# Patient Record
Sex: FEMALE | Race: WHITE | NOT HISPANIC OR LATINO | Employment: FULL TIME | URBAN - METROPOLITAN AREA
[De-identification: names, ages, dates, MRNs, and addresses within clinical notes are randomized per-mention and may not be internally consistent; named-entity substitution may affect disease eponyms.]

---

## 2020-06-12 ENCOUNTER — CLINICAL SUPPORT (OUTPATIENT)
Dept: URGENT CARE | Facility: CLINIC | Age: 25
End: 2020-06-12
Payer: COMMERCIAL

## 2020-06-12 VITALS
HEART RATE: 85 BPM | RESPIRATION RATE: 16 BRPM | DIASTOLIC BLOOD PRESSURE: 95 MMHG | SYSTOLIC BLOOD PRESSURE: 141 MMHG | TEMPERATURE: 98 F | OXYGEN SATURATION: 99 % | WEIGHT: 267 LBS

## 2020-06-12 DIAGNOSIS — R11.0 NAUSEA: ICD-10-CM

## 2020-06-12 DIAGNOSIS — R10.11 RIGHT UPPER QUADRANT ABDOMINAL PAIN: Primary | ICD-10-CM

## 2020-06-12 DIAGNOSIS — K59.01 SLOW TRANSIT CONSTIPATION: ICD-10-CM

## 2020-06-12 LAB
B-HCG UR QL: NEGATIVE
BILIRUB UR QL STRIP: NEGATIVE
CTP QC/QA: YES
GLUCOSE UR QL STRIP: NEGATIVE
KETONES UR QL STRIP: NEGATIVE
LEUKOCYTE ESTERASE UR QL STRIP: NEGATIVE
PH, POC UA: 6.5
POC BLOOD, URINE: NEGATIVE
POC NITRATES, URINE: NEGATIVE
PROT UR QL STRIP: NEGATIVE
SP GR UR STRIP: 1.02 (ref 1–1.03)
UROBILINOGEN UR STRIP-ACNC: NORMAL (ref 0.1–1.1)

## 2020-06-12 PROCEDURE — 81025 URINE PREGNANCY TEST: CPT | Mod: S$GLB,,, | Performed by: NURSE PRACTITIONER

## 2020-06-12 PROCEDURE — 81003 URINALYSIS AUTO W/O SCOPE: CPT | Mod: QW,S$GLB,, | Performed by: NURSE PRACTITIONER

## 2020-06-12 PROCEDURE — 99204 PR OFFICE/OUTPT VISIT, NEW, LEVL IV, 45-59 MIN: ICD-10-PCS | Mod: 25,S$GLB,, | Performed by: NURSE PRACTITIONER

## 2020-06-12 PROCEDURE — 81003 POCT URINALYSIS, DIPSTICK, AUTOMATED, W/O SCOPE: ICD-10-PCS | Mod: QW,S$GLB,, | Performed by: NURSE PRACTITIONER

## 2020-06-12 PROCEDURE — 99204 OFFICE O/P NEW MOD 45 MIN: CPT | Mod: 25,S$GLB,, | Performed by: NURSE PRACTITIONER

## 2020-06-12 PROCEDURE — 81025 POCT URINE PREGNANCY: ICD-10-PCS | Mod: S$GLB,,, | Performed by: NURSE PRACTITIONER

## 2020-06-12 PROCEDURE — 74022 RADEX COMPL AQT ABD SERIES: CPT | Mod: S$GLB,,, | Performed by: NURSE PRACTITIONER

## 2020-06-12 PROCEDURE — 74022 PR XRAY, ABD SERIES, COMPLETE, W/ 2+ VIEWS ABD, 1 VIEW CHEST: ICD-10-PCS | Mod: S$GLB,,, | Performed by: NURSE PRACTITIONER

## 2020-06-12 NOTE — PROGRESS NOTES
"Subjective: stomach pain, upper, nausea,        Patient ID: Maxine Sanchez is a 25 y.o. female.    Vitals:  weight is 121.1 kg (267 lb). Her temperature is 98.1 °F (36.7 °C). Her blood pressure is 141/95 (abnormal) and her pulse is 85. Her respiration is 16 and oxygen saturation is 99%.     Chief Complaint: GI Problem (stomach pain, upper stomach area)    Patient presents today with complaints of upper abdominal pain that first began this morning at 3 am. She has tried TUMs and "making herself vomit" but nothing has helped. The pain is worse when sitting and when taking in a deep breath. She describes it as dull but then gets sharp with sitting and deep breaths. Denies f/c/v/d.     GI Problem   The primary symptoms include abdominal pain and nausea. Primary symptoms do not include fever, fatigue, vomiting, diarrhea or dysuria. The illness began today. The onset was sudden.   The illness does not include chills, anorexia, bloating, constipation or back pain. Associated medical issues do not include GERD, irritable bowel syndrome or diverticulitis.       Constitution: Negative for appetite change, chills, sweating, fatigue and fever.   HENT: Negative for trouble swallowing.    Cardiovascular: Negative for chest pain.   Respiratory: Negative for shortness of breath.    Gastrointestinal: Positive for abdominal pain and nausea. Negative for abdominal trauma, abdominal bloating, history of abdominal surgery, vomiting, constipation, diarrhea, dark colored stools and heartburn.   Genitourinary: Negative for dysuria, missed menses and pelvic pain.   Musculoskeletal: Negative for back pain.       Objective:      Physical Exam   Constitutional: She is oriented to person, place, and time. She appears well-developed and well-nourished.   HENT:   Head: Normocephalic and atraumatic.   Right Ear: External ear normal.   Left Ear: External ear normal.   Nose: Nose normal.   Mouth/Throat: Mucous membranes are normal.   Eyes: " Conjunctivae and lids are normal.   Neck: Trachea normal and full passive range of motion without pain. Neck supple.   Cardiovascular: Normal rate, regular rhythm and normal heart sounds.   Pulmonary/Chest: Effort normal and breath sounds normal. No respiratory distress.   Abdominal: Soft. Normal appearance and bowel sounds are normal. She exhibits no distension, no abdominal bruit, no pulsatile midline mass and no mass. There is tenderness in the right upper quadrant and epigastric area.   Musculoskeletal: Normal range of motion. She exhibits no edema.   Neurological: She is alert and oriented to person, place, and time. She has normal strength.   Skin: Skin is warm, dry, intact, not diaphoretic and not pale.   Psychiatric: She has a normal mood and affect. Her speech is normal and behavior is normal. Judgment and thought content normal. Cognition and memory are normal.   Nursing note and vitals reviewed.        Assessment:       1. Right upper quadrant abdominal pain    2. Slow transit constipation    3. Nausea        Plan:       Xray noted with moderate amount of stool to right upper abdomen. No dilated bowel or gas noted. Do not suspect an acute abdomen. Suggested pt take miralax, Increase fiber in diet, water and add probiotics.   Call or RTC if symptoms worsen or do not begin to improve    Right upper quadrant abdominal pain    Slow transit constipation    Nausea  -     POCT Urinalysis, Dipstick, Automated, W/O Scope  -     POCT urine pregnancy

## 2020-10-01 ENCOUNTER — OFFICE VISIT (OUTPATIENT)
Dept: URGENT CARE | Facility: CLINIC | Age: 25
End: 2020-10-01
Payer: COMMERCIAL

## 2020-10-01 VITALS
HEART RATE: 105 BPM | DIASTOLIC BLOOD PRESSURE: 81 MMHG | RESPIRATION RATE: 16 BRPM | SYSTOLIC BLOOD PRESSURE: 136 MMHG | TEMPERATURE: 98 F | WEIGHT: 271 LBS | OXYGEN SATURATION: 98 % | BODY MASS INDEX: 43.55 KG/M2 | HEIGHT: 66 IN

## 2020-10-01 DIAGNOSIS — M25.562 ACUTE PAIN OF LEFT KNEE: Primary | ICD-10-CM

## 2020-10-01 PROCEDURE — 73564 PR  X-RAY KNEE 4+ VIEW: ICD-10-PCS | Mod: LT,S$GLB,, | Performed by: NURSE PRACTITIONER

## 2020-10-01 PROCEDURE — 99214 OFFICE O/P EST MOD 30 MIN: CPT | Mod: 25,S$GLB,, | Performed by: NURSE PRACTITIONER

## 2020-10-01 PROCEDURE — 99214 PR OFFICE/OUTPT VISIT, EST, LEVL IV, 30-39 MIN: ICD-10-PCS | Mod: 25,S$GLB,, | Performed by: NURSE PRACTITIONER

## 2020-10-01 PROCEDURE — 73564 X-RAY EXAM KNEE 4 OR MORE: CPT | Mod: LT,S$GLB,, | Performed by: NURSE PRACTITIONER

## 2020-10-01 RX ORDER — DICLOFENAC SODIUM 50 MG/1
50 TABLET, DELAYED RELEASE ORAL 3 TIMES DAILY PRN
Qty: 30 TABLET | Refills: 0 | Status: SHIPPED | OUTPATIENT
Start: 2020-10-01 | End: 2024-02-19

## 2020-10-01 RX ORDER — LORATADINE 10 MG/1
10 TABLET ORAL DAILY
COMMUNITY

## 2020-10-01 NOTE — PROGRESS NOTES
"Subjective:       Patient ID: Maxine Sanchez is a 25 y.o. female.    Vitals:  height is 5' 5.5" (1.664 m) and weight is 122.9 kg (271 lb). Her oral temperature is 97.7 °F (36.5 °C). Her blood pressure is 136/81 and her pulse is 105. Her respiration is 16 and oxygen saturation is 98%.     Chief Complaint: Knee Pain    C/O Lt knee pain X 9/30/20, no injury  Has had the pain on and off for a while, denies trauma or injury. Reports pain with ambulation and when leg is extended, but feels ok with full to hyperextension and full flexion    Knee Pain   There was no injury mechanism. The pain is present in the left knee. The pain is mild. The pain has been fluctuating since onset. The symptoms are aggravated by movement, palpation and weight bearing. She has tried nothing for the symptoms.       Constitution: Negative for appetite change, chills and fever.   HENT: Negative for ear pain, congestion and sore throat.    Neck: Negative for painful lymph nodes.   Eyes: Negative for eye discharge and eye redness.   Respiratory: Negative for cough.    Gastrointestinal: Negative for vomiting and diarrhea.   Genitourinary: Negative for dysuria.   Musculoskeletal: Positive for pain, trauma, joint pain and abnormal ROM of joint. Negative for muscle ache.   Skin: Negative for rash.   Neurological: Negative for headaches and seizures.   Hematologic/Lymphatic: Negative for swollen lymph nodes.       Objective:      Physical Exam   Constitutional: She is oriented to person, place, and time. She appears well-developed.   HENT:   Head: Normocephalic and atraumatic.   Nose: Nose normal.   Mouth/Throat: Oropharynx is clear and moist.   Eyes: Pupils are equal, round, and reactive to light. Conjunctivae and EOM are normal.   Neck: Normal range of motion. Neck supple.   Cardiovascular: Normal rate, regular rhythm and normal heart sounds.   Pulmonary/Chest: Effort normal and breath sounds normal.   Abdominal: Soft.   Musculoskeletal:      Left " knee: She exhibits decreased range of motion and effusion. Tenderness found. Patellar tendon tenderness noted.        Legs:    Neurological: She is alert and oriented to person, place, and time.   Skin: Skin is warm and dry. Capillary refill takes less than 2 seconds. Psychiatric: Her behavior is normal.   Nursing note and vitals reviewed.        Assessment:       1. Acute pain of left knee        Plan:       Xray reviewed by me, no fracture or dislocation  Will refer to ortho, recommended RICE, NSAIDs,   Acute pain of left knee  -     XR KNEE 3 VIEW LEFT; Future; Expected date: 10/01/2020  -     Ambulatory referral/consult to Orthopedics    Other orders  -     diclofenac (VOLTAREN) 50 MG EC tablet; Take 1 tablet (50 mg total) by mouth 3 (three) times daily as needed.  Dispense: 30 tablet; Refill: 0

## 2020-10-01 NOTE — PATIENT INSTRUCTIONS
Knee Pain  Knee pain is very common. Its especially common in active people who put a lot of pressure on their knees, like runners. It affects women more often than men.  Your kneecap (patella) is a thick, round bone. It covers and protects the front portion of your knee joint. It moves along a groove in your thighbone (femur) as part of the patellofemoral joint. A layer of cartilage surrounds the underside of your kneecap. This layer protects it from grinding against your femur.  When this cartilage softens and breaks down, it can cause knee pain. This is partly because of repetitive stress. The stress irritates the lining of the joint. This causes pain in the underlying bone.  What causes knee pain?  Many things can cause knee pain. You may have more than one cause. Some of these include:  · Overuse of the knee joint  · The kneecap doesnt line up with the tissue around it  · Damage to small nerves in the area  · Damage to the ligament-like structure that holds the kneecap in place (retinaculum)  · Breakdown of the bone under the cartilage  · Swelling in the soft tissues around the kneecap  · Injury  You might be more likely to have knee pain if you:  · Exercise a lot  · Recently increased the intensity of your workouts  · Have a body mass index (BMI) greater than 25  · Have poor alignment of your kneecap  · Walk with your feet turned overly outward or inward  · Have weakness in surrounding muscle groups (inner quad or hip adductor muscles)  · Have too much tightness in surrounding muscle groups (hamstrings or iliotibial band)  · Have a recent history of injury to the area  · Are female  Symptoms of knee pain  This type of knee pain is a dull, aching pain in the front of the knee in the area under and around the kneecap. This pain may start quickly or slowly. Your pain might be worse when you squat, run, or sit for a long time. You might also sometimes feel like your knee is giving out. You may have symptoms in  one or both of your knees.  Diagnosing knee pain  Your healthcare provider will ask about your medical history and your symptoms. Be sure to describe any activities that make your knee pain worse. He or she will look at your knee. This will include tests of your range of motion, strength, and areas of pain of your knee. Your knee alignment will be checked.  Your healthcare provider will need to rule out other causes of your knee pain, such as arthritis. You may need an imaging test, such as an X-ray or MRI.  Treatment for knee pain  Treatments that can help ease your symptoms may include:  · Avoiding activities for a while that make your pain worse, returning to activity over time  · Icing the outside of your knee when it causes you pain  · Taking over-the-counter pain medicine  · Wearing a knee brace or taping your knee to support it  · Wearing special shoe inserts to help keep your feet in the proper alignment  · Doing special exercises to stretch and strengthen the muscles around your hip and your knee  These steps help most people manage knee pain. But some cases of knee pain need to be treated with surgery. You may need surgery right away. Or you may need it later if other treatments dont work. Your healthcare provider may refer you to an orthopedic surgeon. He or she will talk with you about your choices.  Preventing knee pain  Losing weight and correcting excess muscle tightness or muscle weakness may help lower your risk.  In some cases, you can prevent knee pain. To help prevent a flare-up of knee pain, you do these things:  · Regularly do all the exercises your doctor or physical therapist advises  · Support your knee as advised by your doctor or physical therapist  · Increase training gradually, and ease up on training when needed  · Have an expert check your gait for running or other sporting activities  · Stretch properly before and after exercise  · Replace your running shoes regularly  · Lose excess  weight     When to call your healthcare provider  Call your healthcare provider right away if:  · Your symptoms dont get better after a few weeks of treatment  · You have any new symptoms   Date Last Reviewed: 4/1/2017 © 2000-2017 [employer]. [address] [place] [address]. All rights reserved. This information is not intended as a substitute for professional medical care. Always follow your healthcare professional's instructions.        Arthralgia    Arthralgia is the term for pain in or around the joint. It is a symptom, not a disease. This pain may involve one or more joints. In some cases, the pain moves from joint to joint.  There are many causes for joint pain. These include:  · Injury  · Osteoarthritis (wearing out of the joint surface)  · Gout (inflammation of the joint due to crystals in the joint fluid)  · Infection inside the joint    · Bursitis (inflammation of the fluid-filled sacs around the joint)  · Autoimmune disorders such as rheumatoid arthritis or lupus  · Tendonitis (inflammation of chords that attach muscle to bone)  Home care  · Rest the involved joint(s) until your symptoms improve.   · You may be prescribed pain medicine. If none is prescribed, you may use acetaminophen or ibuprofen to control pain and inflammation.  Follow-up care  Follow up with your healthcare provider or as advised.  When to seek medical advice  Contact your healthcare provider right away if any of the following occurs:  · Pain, swelling, or redness of joint increases  · Pain worsens or recurs after a period of improvement  · Pain moves to other joints  · You cannot bear weight on the affected joint   · You cannot move the affected joint  · Joint appears deformed  · New rash appears  · Fever of 100.4ºF (38ºC) or higher, or as directed by your healthcare provider  Date Last Reviewed: 3/1/2017  © 4070-2099 [employer]. [address] [place] [address]. All rights reserved.  This information is not intended as a substitute for professional medical care. Always follow your healthcare professional's instructions.        RICE     Rest an injury, elevate it, and use ice and compression as directed.   RICE stands for rest, ice, compression, and elevation. These can limit pain and swelling after an injury. RICE may be recommended to help treat fractures, sprains, strains, and bruises or bumps.   Home care  The following explain the details of RICE:  · Rest. Limit the use of the injured body part. This helps prevent further damage to the body part and gives it time to heal. In some cases, you may need a sling, brace, splint, or cast to help keep the body part still until it has healed.  · Ice. Applying ice right after an injury helps relieve pain and swelling. Wrap a bag of ice in a thin towel. Then, place it over the injured area. Do this for 10 to 15 minutes every 3 to 4 hours. Continue for the next 1 to 3 days or until your symptoms improve. Never put ice directly on your skin or ice an area longer than 15 minutes at a time.  · Compression. Putting pressure on an injury helps reduce swelling and provides support. Wrap the injured area firmly with an elastic bandage/wrap. Make sure not to wrap the bandage too tightly or you will cut off blood flow to the injured area. If your bandage loosens, rewrap it.  · Elevation. Keeping an injury raised above the level of your heart reduces swelling, pain, and throbbing. For instance, if you have a broken leg, it may help to rest your leg on several pillows when sitting or lying down. Try to keep the injured area elevated for at least 2 to 3 hours per day.  Follow-up care  Follow up with your healthcare provider, or as advised.  When to seek medical advice  Call your healthcare provider right away if any of these occur:  · Fever of 100.4°F (38°C) or higher, or as directed by your healthcare provider  · Increased pain or swelling in the injured body  part  · Injured body part becomes cold, blue, numb, or tingly  · Signs of infection. These include warmth in the skin, redness, drainage, or bad smell coming from the injured body part.  Date Last Reviewed: 1/18/2016  © 2735-9143 [employer]. [address] [place] [address]. All rights reserved. This information is not intended as a substitute for professional medical care. Always follow your healthcare professional's instructions.

## 2020-10-02 DIAGNOSIS — M25.562 LEFT KNEE PAIN, UNSPECIFIED CHRONICITY: Primary | ICD-10-CM

## 2020-10-05 ENCOUNTER — OFFICE VISIT (OUTPATIENT)
Dept: ORTHOPEDICS | Facility: CLINIC | Age: 25
End: 2020-10-05
Payer: COMMERCIAL

## 2020-10-05 ENCOUNTER — HOSPITAL ENCOUNTER (OUTPATIENT)
Dept: RADIOLOGY | Facility: HOSPITAL | Age: 25
Discharge: HOME | End: 2020-10-05
Attending: ORTHOPAEDIC SURGERY
Payer: COMMERCIAL

## 2020-10-05 VITALS — WEIGHT: 270.94 LBS | RESPIRATION RATE: 15 BRPM | BODY MASS INDEX: 45.14 KG/M2 | HEIGHT: 65 IN

## 2020-10-05 DIAGNOSIS — M25.562 LEFT KNEE PAIN, UNSPECIFIED CHRONICITY: ICD-10-CM

## 2020-10-05 DIAGNOSIS — M25.562 ACUTE PAIN OF LEFT KNEE: Primary | ICD-10-CM

## 2020-10-05 PROCEDURE — 73562 X-RAY EXAM OF KNEE 3: CPT | Mod: 26,RT,, | Performed by: RADIOLOGY

## 2020-10-05 PROCEDURE — 73562 XR KNEE ORTHO LEFT WITH FLEXION: ICD-10-PCS | Mod: 26,RT,, | Performed by: RADIOLOGY

## 2020-10-05 PROCEDURE — 99999 PR PBB SHADOW E&M-EST. PATIENT-LVL III: CPT | Mod: PBBFAC,,, | Performed by: ORTHOPAEDIC SURGERY

## 2020-10-05 PROCEDURE — 99203 PR OFFICE/OUTPT VISIT, NEW, LEVL III, 30-44 MIN: ICD-10-PCS | Mod: S$GLB,,, | Performed by: ORTHOPAEDIC SURGERY

## 2020-10-05 PROCEDURE — 99999 PR PBB SHADOW E&M-EST. PATIENT-LVL III: ICD-10-PCS | Mod: PBBFAC,,, | Performed by: ORTHOPAEDIC SURGERY

## 2020-10-05 PROCEDURE — 3008F PR BODY MASS INDEX (BMI) DOCUMENTED: ICD-10-PCS | Mod: CPTII,S$GLB,, | Performed by: ORTHOPAEDIC SURGERY

## 2020-10-05 PROCEDURE — 73562 X-RAY EXAM OF KNEE 3: CPT | Mod: TC,PN,RT,59

## 2020-10-05 PROCEDURE — 73564 X-RAY EXAM KNEE 4 OR MORE: CPT | Mod: 26,LT,, | Performed by: RADIOLOGY

## 2020-10-05 PROCEDURE — 3008F BODY MASS INDEX DOCD: CPT | Mod: CPTII,S$GLB,, | Performed by: ORTHOPAEDIC SURGERY

## 2020-10-05 PROCEDURE — 73564 XR KNEE ORTHO LEFT WITH FLEXION: ICD-10-PCS | Mod: 26,LT,, | Performed by: RADIOLOGY

## 2020-10-05 PROCEDURE — 99203 OFFICE O/P NEW LOW 30 MIN: CPT | Mod: S$GLB,,, | Performed by: ORTHOPAEDIC SURGERY

## 2020-10-05 NOTE — LETTER
October 7, 2020      Amina Burton, [facility]  [address]  [facility] [address]           [address]  [place] [address]  Phone: [phone]          Patient: Maxine Sanchez   MR Number: [id number]   YOB: 1995   Date of Visit: 10/5/2020       Dear Amina Burton:    Thank you for referring Maxine Sanchez to me for evaluation. Attached you will find relevant portions of my assessment and plan of care.    If you have questions, please do not hesitate to call me. I look forward to following Maxine Sanchez along with you.    Sincerely,    Wilmar Jcaobo MD    Enclosure  CC:  No Recipients    If you would like to receive this communication electronically, please contact [email or web address] or [phone] to request more information on [employer] Link access.    For providers and/or their staff who would like to refer a patient to [facility], please contact us through our one-stop-shop provider referral line, [facility] , at [phone].    If you feel you have received this communication in error or would no longer like to receive these types of communications, please e-mail [email or web address]

## 2020-10-06 ENCOUNTER — TELEPHONE (OUTPATIENT)
Dept: URGENT CARE | Facility: CLINIC | Age: 25
End: 2020-10-06

## 2020-10-07 NOTE — PROGRESS NOTES
History reviewed. No pertinent past medical history.    Past Surgical History:   Procedure Laterality Date    TONSILLECTOMY         Current Outpatient Medications   Medication Sig    desog-e.estradiol/e.estradiol (VIORELE, 28, ORAL) Take by mouth.    diclofenac (VOLTAREN) 50 MG EC tablet Take 1 tablet (50 mg total) by mouth 3 (three) times daily as needed.    loratadine (CLARITIN) 10 mg tablet Take 10 mg by mouth once daily.     No current facility-administered medications for this visit.        Review of patient's allergies indicates:  No Known Allergies    History reviewed. No pertinent family history.    Social History     Socioeconomic History    Marital status: Unknown     Spouse name: Not on file    Number of children: Not on file    Years of education: Not on file    Highest education level: Not on file   Occupational History    Not on file   Social Needs    Financial resource strain: Not on file    Food insecurity     Worry: Not on file     Inability: Not on file    Transportation needs     Medical: Not on file     Non-medical: Not on file   Tobacco Use    Smoking status: Never Smoker   Substance and Sexual Activity    Alcohol use: Not on file    Drug use: Not on file    Sexual activity: Not on file   Lifestyle    Physical activity     Days per week: Not on file     Minutes per session: Not on file    Stress: Not on file   Relationships    Social connections     Talks on phone: Not on file     Gets together: Not on file     Attends [employer] service: Not on file     Active member of club or organization: Not on file     Attends meetings of clubs or organizations: Not on file     Relationship status: Not on file   Other Topics Concern    Not on file   Social History Narrative    Not on file       Chief Complaint:   Chief Complaint   Patient presents with    Left Knee - Pain       Consulting Physician: Amina Burton FNP    History of present illness:    This is a 25 y.o. year old female  "who complains of left knee pain that started on 09/30/2020 after sitting for extended period.  She denies any injury.  She states her pain is a 2/10 worse with walking and in the morning.  She states that overall she has had pain in this knee since she was a child.    Review of Systems:    Constitution:   Denies chills, fever, and sweats.  HENT:   Denies headaches or blurry vision.  Cardiovascular:  Denies chest pain or irregular heart beat.  Respiratory:   Denies cough or shortness of breath.  Gastrointestinal:  Denies abdominal pain, nausea, or vomiting.  Musculoskeletal:   Denies muscle cramps.  Neurological:   Denies dizziness or focal weakness.  Psychiatric/Behavior: Normal mental status.  Hematology/Lymph:  Denies bleeding problem or easy bruising/bleeding.  Skin:    Denies rash or suspicious lesions.    Examination:    Vital Signs:    Vitals:    10/05/20 0948   Resp: 15   Weight: 122.9 kg (270 lb 15.1 oz)   Height: 5' 5" (1.651 m)   PainSc:   2       Body mass index is 45.09 kg/m².    Constitution:   Well-developed, well nourished patient in no acute distress.  Neurological:   Alert and oriented x 3 and cooperative to examination.     Psychiatric/Behavior: Normal mental status.  Respiratory:   No shortness of breath.  Eyes:    Extraoccular muscles intact  Skin:    No scars, rash or suspicious lesions.    Physical Exam: Left Knee Exam    Gait   Normal    Skin  Rash:   None  Scars:   None    Inspection  Erythema:  None  Bruising:  None  Effusion:  None  Masses:  None  Lymphadenopathy: None  Calf   Soft, non-tender    Range of Motion: 0 to 130° with pain    Medial Joint : None  Lateral Joint : None    Patellofemoral Tenderness: Yes  Patellofemoral Crepitus: Yes    Lachman:  Normal  Anterior Drawer: Normal  Posterior Drawer: Normal    Vicenta's:  Negative  Apley's:  Negative    Varus Stress:  Stable  Valgus " Stress:  Stable    Strength:  5/5    Pulses:  Palpable  Sensation:  Intact          Imaging: X-rays ordered and images interpreted today personally by me of left knee shows a lateral riding patella along with a Osgood-Schlatter's fragmentation.         Assessment: Acute pain of left knee        Plan:  We discussed that she has patellofemoral syndrome.  We talked about therapy and she elected to proceed with a home exercise program. Patient elected for physician provided exercise program which was given to them today along with instruction.  We will see her back as needed.        DISCLAIMER: This note may have been dictated using voice recognition software and may contain grammatical errors.     NOTE: Consult report sent to referring provider via "[employer] EMR.

## 2021-08-07 ENCOUNTER — OFFICE VISIT (OUTPATIENT)
Dept: URGENT CARE | Facility: CLINIC | Age: 26
End: 2021-08-07
Payer: COMMERCIAL

## 2021-08-07 VITALS
SYSTOLIC BLOOD PRESSURE: 156 MMHG | HEIGHT: 65 IN | WEIGHT: 270 LBS | DIASTOLIC BLOOD PRESSURE: 102 MMHG | HEART RATE: 96 BPM | OXYGEN SATURATION: 97 % | RESPIRATION RATE: 16 BRPM | BODY MASS INDEX: 44.98 KG/M2 | TEMPERATURE: 99 F
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
## 2021-08-07 DIAGNOSIS — S61.452A DOG BITE OF LEFT HAND, INITIAL ENCOUNTER: Primary | ICD-10-CM

## 2021-08-07 DIAGNOSIS — S61.412A LACERATION OF LEFT HAND WITHOUT FOREIGN BODY, INITIAL ENCOUNTER: ICD-10-CM

## 2021-08-07 PROCEDURE — 90471 IMMUNIZATION ADMIN: CPT | Mod: S$GLB,,, | Performed by: EMERGENCY MEDICINE

## 2021-08-07 PROCEDURE — 90715 TDAP VACCINE GREATER THAN OR EQUAL TO 7YO IM: ICD-10-PCS | Mod: S$GLB,,, | Performed by: EMERGENCY MEDICINE

## 2021-08-07 PROCEDURE — 99213 PR OFFICE/OUTPT VISIT, EST, LEVL III, 20-29 MIN: ICD-10-PCS | Mod: 25,S$GLB,, | Performed by: EMERGENCY MEDICINE

## 2021-08-07 PROCEDURE — 12001 RPR S/N/AX/GEN/TRNK 2.5CM/<: CPT | Mod: S$GLB,,, | Performed by: EMERGENCY MEDICINE

## 2021-08-07 PROCEDURE — 90715 TDAP VACCINE 7 YRS/> IM: CPT | Mod: S$GLB,,, | Performed by: EMERGENCY MEDICINE

## 2021-08-07 PROCEDURE — 73130 XR HAND COMPLETE 3 VIEW LEFT: ICD-10-PCS | Mod: LT,S$GLB,, | Performed by: RADIOLOGY

## 2021-08-07 PROCEDURE — 90471 TDAP VACCINE GREATER THAN OR EQUAL TO 7YO IM: ICD-10-PCS | Mod: S$GLB,,, | Performed by: EMERGENCY MEDICINE

## 2021-08-07 PROCEDURE — 73130 X-RAY EXAM OF HAND: CPT | Mod: LT,S$GLB,, | Performed by: RADIOLOGY

## 2021-08-07 PROCEDURE — 99213 OFFICE O/P EST LOW 20 MIN: CPT | Mod: 25,S$GLB,, | Performed by: EMERGENCY MEDICINE

## 2021-08-07 PROCEDURE — 12001 LACERATION REPAIR: ICD-10-PCS | Mod: S$GLB,,, | Performed by: EMERGENCY MEDICINE

## 2021-08-07 RX ORDER — AMOXICILLIN AND CLAVULANATE POTASSIUM 875; 125 MG/1; MG/1
1 TABLET, FILM COATED ORAL EVERY 12 HOURS
Qty: 10 TABLET | Refills: 0 | Status: SHIPPED | OUTPATIENT
Start: 2021-08-07 | End: 2021-08-12

## 2021-08-09 ENCOUNTER — OFFICE VISIT (OUTPATIENT)
Dept: URGENT CARE | Facility: CLINIC | Age: 26
End: 2021-08-09
Payer: COMMERCIAL

## 2021-08-09 VITALS
TEMPERATURE: 98 F | RESPIRATION RATE: 16 BRPM | HEIGHT: 65 IN | BODY MASS INDEX: 44.98 KG/M2 | HEART RATE: 84 BPM | WEIGHT: 270 LBS | OXYGEN SATURATION: 98 % | SYSTOLIC BLOOD PRESSURE: 125 MMHG | DIASTOLIC BLOOD PRESSURE: 75 MMHG
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
## 2021-08-09 DIAGNOSIS — S61.412A LACERATION OF LEFT HAND WITHOUT FOREIGN BODY, INITIAL ENCOUNTER: ICD-10-CM

## 2021-08-09 PROCEDURE — 99213 OFFICE O/P EST LOW 20 MIN: CPT | Mod: S$GLB,,, | Performed by: FAMILY MEDICINE

## 2021-08-09 PROCEDURE — 99213 PR OFFICE/OUTPT VISIT, EST, LEVL III, 20-29 MIN: ICD-10-PCS | Mod: S$GLB,,, | Performed by: FAMILY MEDICINE

## 2021-08-17 ENCOUNTER — OFFICE VISIT (OUTPATIENT)
Dept: URGENT CARE | Facility: CLINIC | Age: 26
End: 2021-08-17
Payer: COMMERCIAL

## 2021-08-17 VITALS
SYSTOLIC BLOOD PRESSURE: 120 MMHG | TEMPERATURE: 99 F | RESPIRATION RATE: 16 BRPM | DIASTOLIC BLOOD PRESSURE: 80 MMHG | HEART RATE: 87 BPM | OXYGEN SATURATION: 98 % | WEIGHT: 270 LBS | BODY MASS INDEX: 44.93 KG/M2
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
## 2021-08-17 DIAGNOSIS — S61.412D LACERATION OF SKIN OF LEFT HAND, SUBSEQUENT ENCOUNTER: ICD-10-CM

## 2021-08-17 PROCEDURE — 99213 PR OFFICE/OUTPT VISIT, EST, LEVL III, 20-29 MIN: ICD-10-PCS | Mod: S$GLB,,, | Performed by: FAMILY MEDICINE

## 2021-08-17 PROCEDURE — 99213 OFFICE O/P EST LOW 20 MIN: CPT | Mod: S$GLB,,, | Performed by: FAMILY MEDICINE

## 2022-01-14 ENCOUNTER — OFFICE VISIT (OUTPATIENT)
Dept: URGENT CARE | Facility: CLINIC | Age: 27
End: 2022-01-14
Payer: COMMERCIAL

## 2022-01-14 VITALS
HEIGHT: 65 IN | BODY MASS INDEX: 47.48 KG/M2 | TEMPERATURE: 98 F | RESPIRATION RATE: 20 BRPM | WEIGHT: 285 LBS | OXYGEN SATURATION: 98 % | HEART RATE: 89 BPM | DIASTOLIC BLOOD PRESSURE: 83 MMHG | SYSTOLIC BLOOD PRESSURE: 132 MMHG

## 2022-01-14 DIAGNOSIS — R05.9 COUGH: ICD-10-CM

## 2022-01-14 DIAGNOSIS — J02.9 SORE THROAT: ICD-10-CM

## 2022-01-14 DIAGNOSIS — U07.1 COVID-19 VIRUS DETECTED: Primary | ICD-10-CM

## 2022-01-14 LAB
CTP QC/QA: YES
CTP QC/QA: YES
FLUAV AG NPH QL: NEGATIVE
FLUBV AG NPH QL: NEGATIVE
SARS-COV-2 AG RESP QL IA.RAPID: POSITIVE

## 2022-01-14 PROCEDURE — 3079F DIAST BP 80-89 MM HG: CPT | Mod: CPTII,S$GLB,, | Performed by: NURSE PRACTITIONER

## 2022-01-14 PROCEDURE — 99214 OFFICE O/P EST MOD 30 MIN: CPT | Mod: S$GLB,,, | Performed by: NURSE PRACTITIONER

## 2022-01-14 PROCEDURE — 3075F PR MOST RECENT SYSTOLIC BLOOD PRESS GE 130-139MM HG: ICD-10-PCS | Mod: CPTII,S$GLB,, | Performed by: NURSE PRACTITIONER

## 2022-01-14 PROCEDURE — 3079F PR MOST RECENT DIASTOLIC BLOOD PRESSURE 80-89 MM HG: ICD-10-PCS | Mod: CPTII,S$GLB,, | Performed by: NURSE PRACTITIONER

## 2022-01-14 PROCEDURE — 99214 PR OFFICE/OUTPT VISIT, EST, LEVL IV, 30-39 MIN: ICD-10-PCS | Mod: S$GLB,,, | Performed by: NURSE PRACTITIONER

## 2022-01-14 PROCEDURE — 1159F MED LIST DOCD IN RCRD: CPT | Mod: CPTII,S$GLB,, | Performed by: NURSE PRACTITIONER

## 2022-01-14 PROCEDURE — 1160F PR REVIEW ALL MEDS BY PRESCRIBER/CLIN PHARMACIST DOCUMENTED: ICD-10-PCS | Mod: CPTII,S$GLB,, | Performed by: NURSE PRACTITIONER

## 2022-01-14 PROCEDURE — 1160F RVW MEDS BY RX/DR IN RCRD: CPT | Mod: CPTII,S$GLB,, | Performed by: NURSE PRACTITIONER

## 2022-01-14 PROCEDURE — 87804 INFLUENZA ASSAY W/OPTIC: CPT | Mod: QW,,, | Performed by: NURSE PRACTITIONER

## 2022-01-14 PROCEDURE — 3008F PR BODY MASS INDEX (BMI) DOCUMENTED: ICD-10-PCS | Mod: CPTII,S$GLB,, | Performed by: NURSE PRACTITIONER

## 2022-01-14 PROCEDURE — 87811 SARS CORONAVIRUS 2 ANTIGEN POCT, MANUAL READ: ICD-10-PCS | Mod: S$GLB,,, | Performed by: NURSE PRACTITIONER

## 2022-01-14 PROCEDURE — 1159F PR MEDICATION LIST DOCUMENTED IN MEDICAL RECORD: ICD-10-PCS | Mod: CPTII,S$GLB,, | Performed by: NURSE PRACTITIONER

## 2022-01-14 PROCEDURE — 87804 POCT INFLUENZA A/B: ICD-10-PCS | Mod: QW,,, | Performed by: NURSE PRACTITIONER

## 2022-01-14 PROCEDURE — 3008F BODY MASS INDEX DOCD: CPT | Mod: CPTII,S$GLB,, | Performed by: NURSE PRACTITIONER

## 2022-01-14 PROCEDURE — 87811 SARS-COV-2 COVID19 W/OPTIC: CPT | Mod: S$GLB,,, | Performed by: NURSE PRACTITIONER

## 2022-01-14 PROCEDURE — 3075F SYST BP GE 130 - 139MM HG: CPT | Mod: CPTII,S$GLB,, | Performed by: NURSE PRACTITIONER

## 2022-01-14 RX ORDER — ALBUTEROL SULFATE 90 UG/1
2 AEROSOL, METERED RESPIRATORY (INHALATION) EVERY 6 HOURS PRN
Qty: 8 G | Refills: 0 | Status: SHIPPED | OUTPATIENT
Start: 2022-01-14 | End: 2024-02-19

## 2022-01-14 RX ORDER — FLUTICASONE PROPIONATE 50 MCG
1 SPRAY, SUSPENSION (ML) NASAL DAILY
Qty: 11.1 ML | Refills: 0 | Status: SHIPPED | OUTPATIENT
Start: 2022-01-14 | End: 2024-02-19

## 2022-01-14 RX ORDER — VALACYCLOVIR HYDROCHLORIDE 1 G/1
4000 TABLET, FILM COATED ORAL ONCE
COMMUNITY
Start: 2022-01-10

## 2022-01-14 RX ORDER — IXEKIZUMAB 80 MG/ML
INJECTION, SOLUTION SUBCUTANEOUS
COMMUNITY
Start: 2021-12-23 | End: 2024-02-19

## 2022-01-14 RX ORDER — BENZONATATE 200 MG/1
200 CAPSULE ORAL 3 TIMES DAILY PRN
Qty: 21 CAPSULE | Refills: 0 | Status: SHIPPED | OUTPATIENT
Start: 2022-01-14 | End: 2024-02-19

## 2022-01-14 RX ORDER — DESOGESTREL AND ETHINYL ESTRADIOL AND ETHINYL ESTRADIOL 21-5 (28)
1 KIT ORAL DAILY
COMMUNITY
Start: 2021-12-09 | End: 2024-02-19

## 2022-01-14 RX ORDER — KETOCONAZOLE 20 MG/ML
SHAMPOO, SUSPENSION TOPICAL
COMMUNITY
Start: 2022-01-06 | End: 2024-02-19

## 2022-01-14 NOTE — PATIENT INSTRUCTIONS
Patient Education       COVID-19 Discharge Instructions   About this topic   Coronavirus disease 2019 is also known as COVID-19. It is a viral illness that infects the lungs. It is caused by a virus called SARS-associated coronavirus (SARS-CoV-2).  The signs of COVID-19 most often start a few days after you have been infected. In some people, it takes longer to show signs. Others never show signs of the infection. You may have a cough, fever, shaking chills and it may be hard to breathe. You may be very tired, have muscle aches, a headache or sore throat. Some people have an upset stomach or loose stools. Others lose their sense of smell or taste. You may not have these signs all the time and they may come and go while you are sick.  The virus spreads easily through droplets when you talk, sneeze, or cough. You can pass the virus to others when you are talking close together, singing, hugging, sharing food, or shaking hands. Doctors believe the germs also survive on surfaces like tables, door handles, and telephones. However, this is not a common way that COVID-19 spreads. Doctors believe you can also spread the infection even if you dont have any symptoms, but they do not know how that happens. This is why getting vaccinated is one of the best ways to keep you healthy and slow the spread of the virus.  Some people have a mild case of COVID-19 and are able to stay at home and away from others until they feel better. Others may need to be in the hospital if they are very sick. Some people with COVID-19 can have some symptoms for weeks or months. People with COVID-19 must isolate themselves. You can start to be around others when your doctor says it is safe to do so.       What care is needed at home?   · Ask your doctor what you need to do when you go home. Make sure you ask questions if you do not understand what the doctor says.  · Drink lots of water, juice, or broth to replace fluids lost from a fever.  · You  may use cool mist humidifiers to help ease congestion and coughing.  · Use 2 to 3 pillows to prop yourself up when you lie down to make it easier to breathe and sleep.  · Do not smoke and do not drink beer, wine, and mixed drinks (alcohol).  · To lower the chance of passing the infection to others, get a COVID-19 vaccine after your infection has resolved.  · If you have not been fully vaccinated:  ? Wear a mask over your mouth and nose if you are around others who are not sick. Cloth masks work best if they have more than one layer of fabric.  ? Wash your hands often.  ? Stay home in a separate room, if possible, away from others. Only go out to get medical care.  ? Use a separate bathroom if possible.  ? Do not make food for others.  What follow-up care is needed?   · Your doctor may ask you to make visits to the office to check on your progress. Be sure to keep these visits. Make sure you wear a mask at these visits.  · If you can, tell the staff you have COVID-19 ahead of time so they can take extra care to stop the disease from spreading.  · It may take a few weeks before your health returns to normal.  What drugs may be needed?   The doctor may order drugs to:  · Help with breathing  · Help with fever  · Help with swelling in your airways and lungs  · Control coughing  · Ease a sore throat  · Help a runny or stuffy nose  Will physical activity be limited?   You may have to limit your physical activity. Talk to your doctor about the right amount of activity for you. If you have been very sick with COVID-19, it can take some time to get your strength back.  Will there be any other care needed?   Doctors do not know how long you can pass the virus on to others after you are sick. This is why it is important to stay in a separate room, if possible, when you are sick. For now, doctors are giving general guidelines for you to follow after you have been sick. Before you go around other people, you should:  · Be fever  free for 24 hours without taking any drugs to lower the fever  · Have no symptoms of cough or shortness of breath  · Wait at least 10 days after first having symptoms or your first positive test, and you need to be symptom free as above. Some experts suggest waiting 20 days if you have had a more severe infection.  Talk with your doctor about getting a COVID-19 vaccine.  What problems could happen?   · Fluid loss. This is dehydration.  · Short-term or long-term lung damage  · Heart problems  · Death  When do I need to call the doctor?   · You are having so much trouble breathing that you can only say one or two words at a time.  · You need to sit upright at all times to be able to breathe and/or cannot lie down.  · You are very confused or cannot stay awake.  · Your lips or skin start to turn blue or grey.  · You think you might be having a medical emergency. Some examples of medical emergencies are:  ? Severe chest pain.  ? Not able to speak or move normally.  · You have trouble breathing when talking or sitting still.  · You have new shortness of breath.  · You become weak or dizzy.  · You have very dark urine or do not pass urine for more than 8 hours.  · You have new or worsening COVID-19 symptoms like:  ? Fever  ? Cough  ? Feeling very tired  ? Shaking chills  ? Headache  ? Trouble swallowing  ? Throwing up  ? Loose stools  ? Reddish purple spots on your fingers or toes  Teach Back: Helping You Understand   The Teach Back Method helps you understand the information we are giving you. After you talk with the staff, tell them in your own words what you learned. This helps to make sure the staff has described each thing clearly. It also helps to explain things that may have been confusing. Before going home, make sure you can do these:  · I can tell you about my condition.  · I can tell you what may help ease my breathing.  · I can tell you what I can do to help avoid passing the infection to others.  · I can tell  you what I will do if I have trouble breathing; feel sleepy or confused; or my fingertips, fingernails, skin, or lips are blue.  Where can I learn more?   Centers for Disease Control and Prevention  https://www.cdc.gov/coronavirus/2019-ncov/about/index.html   Centers for Disease Control and Prevention  https://www.cdc.gov/coronavirus/2019-ncov/hcp/disposition-in-home-patients.html   World Health Organization  https://www.who.int/news-room/q-a-detail/d-y-xoeefwtayfrdb   Last Reviewed Date   2021-10-05  Consumer Information Use and Disclaimer   This information is not specific medical advice and does not replace information you receive from your health care provider. This is only a brief summary of general information. It does NOT include all information about conditions, illnesses, injuries, tests, procedures, treatments, therapies, discharge instructions or life-style choices that may apply to you. You must talk with your health care provider for complete information about your health and treatment options. This information should not be used to decide whether or not to accept your health care providers advice, instructions or recommendations. Only your health care provider has the knowledge and training to provide advice that is right for you.  Copyright   Copyright © 2021 [employer] and its affiliates and/or licensors. All rights reserved.

## 2022-01-14 NOTE — PROGRESS NOTES
"Subjective:       Patient ID: Maxine Sanchez is a 26 y.o. female.    Vitals:  height is 5' 5" (1.651 m) and weight is 129.3 kg (285 lb). Her temperature is 98.3 °F (36.8 °C). Her blood pressure is 132/83 and her pulse is 89. Her respiration is 20 and oxygen saturation is 98%.     Chief Complaint: Cough    26-year-old female presents with nasal congestion, sore throat, and cough x 2 days. She has been taking Dayquil with minimal relief. Denies fever, shortness of breath, chest pain, abdominal pain, NVD, rash.    Cough  This is a new problem. The current episode started in the past 7 days. The problem has been gradually worsening. The cough is non-productive. Associated symptoms include a sore throat. Pertinent negatives include no chest pain, chills, ear pain, fever, headaches, postnasal drip, rash, shortness of breath or wheezing.       Constitution: Negative for activity change, appetite change, chills, sweating, fatigue, fever and generalized weakness.   HENT: Positive for congestion and sore throat. Negative for ear pain, ear discharge, postnasal drip, sinus pain, sinus pressure, trouble swallowing and voice change.    Neck: Negative for neck pain and neck stiffness.   Cardiovascular: Negative for chest pain and sob on exertion.   Eyes: Negative for eye discharge, eye itching and eye pain.   Respiratory: Positive for cough. Negative for chest tightness, sputum production, COPD, shortness of breath and wheezing.    Gastrointestinal: Negative for abdominal pain, nausea, vomiting, constipation and diarrhea.   Genitourinary: Negative for dysuria, frequency, urgency and flank pain.   Musculoskeletal: Negative for pain.   Skin: Negative for rash.   Neurological: Negative for dizziness, light-headedness, coordination disturbances, headaches, disorientation, altered mental status, loss of consciousness, numbness, tingling, seizures and tremors.   Psychiatric/Behavioral: Negative for altered mental status and " disorientation.       Objective:      Physical Exam   Constitutional: She is oriented to person, place, and time.  Non-toxic appearance. She does not appear ill. No distress. normal  HENT:   Head: Normocephalic and atraumatic.   Mouth/Throat: Mucous membranes are moist. Posterior oropharyngeal erythema present. Oropharynx is clear.   Eyes: Right eye exhibits no discharge. Left eye exhibits no discharge. No scleral icterus.   Cardiovascular: Normal rate, regular rhythm, normal heart sounds and normal pulses.   No murmur heard.Exam reveals no gallop and no friction rub.   Pulmonary/Chest: Effort normal and breath sounds normal. No stridor. No respiratory distress. She has no wheezes. She has no rhonchi. She has no rales.   Abdominal: Normal appearance. She exhibits no distension. Soft.   Musculoskeletal: Normal range of motion.         General: Normal range of motion.   Neurological: She is alert and oriented to person, place, and time.   Skin: Skin is warm and dry. Capillary refill takes less than 2 seconds.   Psychiatric: Her behavior is normal. Mood normal.         Assessment:       1. COVID-19 virus detected    2. Sore throat    3. Cough          Plan:       1    VSS. Nontoxic appearing. Uvula midline. No tonsillitis or tonsillar exudate. Do not suspect peritonsillar abscess. No adventitious sounds on lung exam. Discussed CDC guidelines for quarantining. ED precautions for any shortness of breath, chest pain, or any acutely worsening of symptoms.       COVID-19 virus detected    Sore throat    Cough  -     SARS Coronavirus 2 Antigen, POCT Manual Read  -     POCT Influenza A/B    Other orders  -     benzonatate (TESSALON) 200 MG capsule; Take 1 capsule (200 mg total) by mouth 3 (three) times daily as needed for Cough.  Dispense: 21 capsule; Refill: 0  -     albuterol (VENTOLIN HFA) 90 mcg/actuation inhaler; Inhale 2 puffs into the lungs every 6 (six) hours as needed for Wheezing or Shortness of Breath. Rescue   Dispense: 8 g; Refill: 0  -     fluticasone propionate (FLONASE) 50 mcg/actuation nasal spray; 1 spray (50 mcg total) by Each Nostril route once daily.  Dispense: 11.1 mL; Refill: 0              Additional MDM:     Heart Failure Score:   COPD = No

## 2023-01-20 ENCOUNTER — OFFICE VISIT (OUTPATIENT)
Dept: URGENT CARE | Facility: CLINIC | Age: 28
End: 2023-01-20
Payer: COMMERCIAL

## 2023-01-20 VITALS
BODY MASS INDEX: 43.95 KG/M2 | DIASTOLIC BLOOD PRESSURE: 86 MMHG | OXYGEN SATURATION: 99 % | WEIGHT: 263.81 LBS | HEIGHT: 65 IN | SYSTOLIC BLOOD PRESSURE: 132 MMHG | RESPIRATION RATE: 20 BRPM | HEART RATE: 87 BPM | TEMPERATURE: 98 F

## 2023-01-20 DIAGNOSIS — J22 LOWER RESPIRATORY INFECTION: Primary | ICD-10-CM

## 2023-01-20 PROCEDURE — 3079F DIAST BP 80-89 MM HG: CPT | Mod: CPTII,S$GLB,, | Performed by: NURSE PRACTITIONER

## 2023-01-20 PROCEDURE — 1159F PR MEDICATION LIST DOCUMENTED IN MEDICAL RECORD: ICD-10-PCS | Mod: CPTII,S$GLB,, | Performed by: NURSE PRACTITIONER

## 2023-01-20 PROCEDURE — 3008F BODY MASS INDEX DOCD: CPT | Mod: CPTII,S$GLB,, | Performed by: NURSE PRACTITIONER

## 2023-01-20 PROCEDURE — 3075F SYST BP GE 130 - 139MM HG: CPT | Mod: CPTII,S$GLB,, | Performed by: NURSE PRACTITIONER

## 2023-01-20 PROCEDURE — 3008F PR BODY MASS INDEX (BMI) DOCUMENTED: ICD-10-PCS | Mod: CPTII,S$GLB,, | Performed by: NURSE PRACTITIONER

## 2023-01-20 PROCEDURE — 3079F PR MOST RECENT DIASTOLIC BLOOD PRESSURE 80-89 MM HG: ICD-10-PCS | Mod: CPTII,S$GLB,, | Performed by: NURSE PRACTITIONER

## 2023-01-20 PROCEDURE — 3075F PR MOST RECENT SYSTOLIC BLOOD PRESS GE 130-139MM HG: ICD-10-PCS | Mod: CPTII,S$GLB,, | Performed by: NURSE PRACTITIONER

## 2023-01-20 PROCEDURE — 1159F MED LIST DOCD IN RCRD: CPT | Mod: CPTII,S$GLB,, | Performed by: NURSE PRACTITIONER

## 2023-01-20 PROCEDURE — 99213 OFFICE O/P EST LOW 20 MIN: CPT | Mod: S$GLB,,, | Performed by: NURSE PRACTITIONER

## 2023-01-20 PROCEDURE — 1160F PR REVIEW ALL MEDS BY PRESCRIBER/CLIN PHARMACIST DOCUMENTED: ICD-10-PCS | Mod: CPTII,S$GLB,, | Performed by: NURSE PRACTITIONER

## 2023-01-20 PROCEDURE — 99213 PR OFFICE/OUTPT VISIT, EST, LEVL III, 20-29 MIN: ICD-10-PCS | Mod: S$GLB,,, | Performed by: NURSE PRACTITIONER

## 2023-01-20 PROCEDURE — 1160F RVW MEDS BY RX/DR IN RCRD: CPT | Mod: CPTII,S$GLB,, | Performed by: NURSE PRACTITIONER

## 2023-01-20 RX ORDER — AZITHROMYCIN 250 MG/1
TABLET, FILM COATED ORAL
Qty: 6 TABLET | Refills: 0 | Status: SHIPPED | OUTPATIENT
Start: 2023-01-20 | End: 2023-01-25

## 2023-01-20 RX ORDER — ALBUTEROL SULFATE 90 UG/1
2 AEROSOL, METERED RESPIRATORY (INHALATION) EVERY 6 HOURS PRN
Qty: 8 G | Refills: 0 | Status: SHIPPED | OUTPATIENT
Start: 2023-01-20 | End: 2024-02-19

## 2023-01-20 RX ORDER — ALBUTEROL SULFATE 90 UG/1
2 AEROSOL, METERED RESPIRATORY (INHALATION) EVERY 6 HOURS PRN
Qty: 18 G | Refills: 0 | Status: SHIPPED | OUTPATIENT
Start: 2023-01-20 | End: 2024-01-20

## 2023-01-20 RX ORDER — PROMETHAZINE HYDROCHLORIDE AND DEXTROMETHORPHAN HYDROBROMIDE 6.25; 15 MG/5ML; MG/5ML
5 SYRUP ORAL 3 TIMES DAILY PRN
Qty: 240 ML | Refills: 0 | Status: SHIPPED | OUTPATIENT
Start: 2023-01-20 | End: 2023-01-30

## 2023-01-20 NOTE — LETTER
January 20, 2023    Maxine Sanchez  [address]  [facility] [address]         [facility]  [address] [facility] [address]  Phone: [phone] January 20, 2023     Patient: Maxine Sanchez   YOB: 1995   Date of Visit: 1/20/2023       To Whom It May Concern:    It is my medical opinion that Maxine Sanchez may return to work on 1/22/23 .    If you have any questions or concerns, please don't hesitate to call.    Sincerely,        Re Ulrich, NP

## 2023-01-20 NOTE — PROGRESS NOTES
"Subjective:       Patient ID: Maxine Sanchez is a 27 y.o. female.    Vitals:  height is 5' 5" (1.651 m) and weight is 119.7 kg (263 lb 12.8 oz). Her temperature is 98.3 °F (36.8 °C). Her blood pressure is 132/86 and her pulse is 87. Her respiration is 20 and oxygen saturation is 99%.     Chief Complaint: Cough    Patient has been sick for several days,  has same symptoms.     Cough  The current episode started in the past 7 days (2 days). The problem has been waxing and waning. Associated symptoms include chills, ear pain, a fever, postnasal drip and a sore throat. Pertinent negatives include no shortness of breath.     Constitution: Positive for chills, fatigue and fever.   HENT:  Positive for ear pain, congestion, postnasal drip and sore throat.    Neck: neck negative.   Cardiovascular: Negative.    Respiratory:  Positive for cough and sputum production. Negative for shortness of breath.    Gastrointestinal: Negative.    Neurological: Negative.      Objective:      Physical Exam   Constitutional: She is oriented to person, place, and time. No distress.   HENT:   Head: Normocephalic and atraumatic.   Ears:   Right Ear: Tympanic membrane normal.   Left Ear: impacted cerumen  Nose: Congestion present.   Mouth/Throat: Posterior oropharyngeal erythema present.   Eyes: Pupils are equal, round, and reactive to light.   Cardiovascular: Normal rate and regular rhythm.   No murmur heard.  Pulmonary/Chest: Effort normal. She has rhonchi.   Abdominal: Normal appearance and bowel sounds are normal. There is no abdominal tenderness.   Neurological: She is alert and oriented to person, place, and time.   Psychiatric: Her behavior is normal. Mood normal.       Assessment:       1. Lower respiratory infection          Plan:     COVID test negative at home, declined other swabs. Follow up with PCP if symptoms are not resolving in 48-72 hours, follow up immediately for new or worsening symptoms, ED precautions discussed.  "     Lower respiratory infection  -     azithromycin (Z-WILDER) 250 MG tablet; Take 2 tablets by mouth on day 1; Take 1 tablet by mouth on days 2-5  Dispense: 6 tablet; Refill: 0  -     albuterol (VENTOLIN HFA) 90 mcg/actuation inhaler; Inhale 2 puffs into the lungs every 6 (six) hours as needed for Wheezing. Rescue  Dispense: 18 g; Refill: 0  -     promethazine-dextromethorphan (PROMETHAZINE-DM) 6.25-15 mg/5 mL Syrp; Take 5 mLs by mouth 3 (three) times daily as needed.  Dispense: 240 mL; Refill: 0  -     albuterol (VENTOLIN HFA) 90 mcg/actuation inhaler; Inhale 2 puffs into the lungs every 6 (six) hours as needed. Rescue  Dispense: 8 g; Refill: 0

## 2023-11-27 LAB
ABO + RH BLD: NORMAL
ANTIBODY SCREEN: NEGATIVE
HBV SURFACE AG SERPL QL IA: NEGATIVE
HCV AB SERPL QL IA: NONREACTIVE
HIV 1+2 AB+HIV1 P24 AG SERPL QL IA: NONREACTIVE
RPR: NONREACTIVE
RUBELLA AB, IGG (OLG): 1.52 IU/ML
RUBELLA IMMUNE STATUS: NORMAL

## 2023-12-08 LAB
C TRACH RRNA SPEC QL PROBE: NEGATIVE
N GONORRHOEAE, AMPLIFIED DNA: NEGATIVE

## 2024-01-12 ENCOUNTER — TELEPHONE (OUTPATIENT)
Dept: MATERNAL FETAL MEDICINE | Facility: CLINIC | Age: 29
End: 2024-01-12
Payer: COMMERCIAL

## 2024-01-12 DIAGNOSIS — O24.111 PRE-EXISTING TYPE 2 DIABETES MELLITUS DURING PREGNANCY IN FIRST TRIMESTER: Primary | ICD-10-CM

## 2024-01-12 NOTE — TELEPHONE ENCOUNTER
Called patient and scheduled her for a Maternal Fetal Medicine Ultrasound and Consult for 01/22 at 2 PM.  Patient verbalized her understanding.

## 2024-01-22 ENCOUNTER — PROCEDURE VISIT (OUTPATIENT)
Dept: MATERNAL FETAL MEDICINE | Facility: CLINIC | Age: 29
End: 2024-01-22
Payer: COMMERCIAL

## 2024-01-22 ENCOUNTER — OFFICE VISIT (OUTPATIENT)
Dept: MATERNAL FETAL MEDICINE | Facility: CLINIC | Age: 29
End: 2024-01-22
Attending: OBSTETRICS & GYNECOLOGY
Payer: COMMERCIAL

## 2024-01-22 VITALS
BODY MASS INDEX: 43.45 KG/M2 | SYSTOLIC BLOOD PRESSURE: 132 MMHG | HEIGHT: 65 IN | DIASTOLIC BLOOD PRESSURE: 69 MMHG | WEIGHT: 260.81 LBS

## 2024-01-22 DIAGNOSIS — O24.111 TYPE 2 DIABETES MELLITUS AFFECTING PREGNANCY IN FIRST TRIMESTER, ANTEPARTUM: Primary | ICD-10-CM

## 2024-01-22 DIAGNOSIS — Z36.89 ENCOUNTER FOR FETAL ANATOMIC SURVEY: ICD-10-CM

## 2024-01-22 DIAGNOSIS — O24.111 PRE-EXISTING TYPE 2 DIABETES MELLITUS DURING PREGNANCY IN FIRST TRIMESTER: ICD-10-CM

## 2024-01-22 PROCEDURE — 99999 PR PBB SHADOW E&M-EST. PATIENT-LVL IV: CPT | Mod: PBBFAC,,, | Performed by: OBSTETRICS & GYNECOLOGY

## 2024-01-22 PROCEDURE — 99204 OFFICE O/P NEW MOD 45 MIN: CPT | Mod: 25,S$GLB,, | Performed by: OBSTETRICS & GYNECOLOGY

## 2024-01-22 PROCEDURE — 3008F BODY MASS INDEX DOCD: CPT | Mod: CPTII,S$GLB,, | Performed by: OBSTETRICS & GYNECOLOGY

## 2024-01-22 PROCEDURE — 76801 OB US < 14 WKS SINGLE FETUS: CPT | Mod: S$GLB,,, | Performed by: OBSTETRICS & GYNECOLOGY

## 2024-01-22 PROCEDURE — 3075F SYST BP GE 130 - 139MM HG: CPT | Mod: CPTII,S$GLB,, | Performed by: OBSTETRICS & GYNECOLOGY

## 2024-01-22 PROCEDURE — 1159F MED LIST DOCD IN RCRD: CPT | Mod: CPTII,S$GLB,, | Performed by: OBSTETRICS & GYNECOLOGY

## 2024-01-22 PROCEDURE — 76813 OB US NUCHAL MEAS 1 GEST: CPT | Mod: S$GLB,,, | Performed by: OBSTETRICS & GYNECOLOGY

## 2024-01-22 PROCEDURE — 3078F DIAST BP <80 MM HG: CPT | Mod: CPTII,S$GLB,, | Performed by: OBSTETRICS & GYNECOLOGY

## 2024-01-22 RX ORDER — NAPROXEN SODIUM 220 MG
1 TABLET ORAL 4 TIMES DAILY
Qty: 120 EACH | Refills: 12 | Status: SHIPPED | OUTPATIENT
Start: 2024-01-22

## 2024-01-22 RX ORDER — INSULIN HUMAN 100 [IU]/ML
14 INJECTION, SUSPENSION SUBCUTANEOUS NIGHTLY
Qty: 20 ML | Refills: 12 | Status: SHIPPED | OUTPATIENT
Start: 2024-01-22 | End: 2024-02-05

## 2024-01-22 RX ORDER — INSULIN ASPART 100 [IU]/ML
14 INJECTION, SOLUTION INTRAVENOUS; SUBCUTANEOUS
Qty: 30 ML | Refills: 12 | Status: SHIPPED | OUTPATIENT
Start: 2024-01-22

## 2024-01-22 RX ORDER — METFORMIN HYDROCHLORIDE 1000 MG/1
1000 TABLET ORAL 2 TIMES DAILY
COMMUNITY
Start: 2024-01-01 | End: 2024-02-19

## 2024-01-22 NOTE — PROGRESS NOTES
Chief complaint: Pre gestational DM Type 2    Provider requesting consultation: Dr. Momin    28 y.o. at 13w5d EGA    PMH:  Past Medical History:   Diagnosis Date    Diabetes mellitus, type 2     Psoriasis        PObHx:  OB History    Para Term  AB Living   1             SAB IAB Ectopic Multiple Live Births                  # Outcome Date GA Lbr Vincent/2nd Weight Sex Delivery Anes PTL Lv   1 Current                PSH:  Past Surgical History:   Procedure Laterality Date    TONSILLECTOMY         Family history:family history is not on file.    Social history: reports that she has never smoked. She has never used smokeless tobacco. She reports that she does not currently use alcohol. She reports that she does not use drugs.    A detailed fetal anatomical ultrasound was completed today.  See details in imaging section of EPIC.      Pregestational Diabetes  The patient was counseled regarding the risks of diabetes in pregnancy. These risks include but are not limited to an increased risk of , preeclampsia/gestational hypertension, fetal structural anomalies, macrosomia, prematurity, shoulder dystocia/birth injury,  hyperbilirubinemia and electrolyte issues, pulmonary immaturity and sudden stillbirth especially in those patients with poor glucose control. I also discussed with the patient the need for increased fetal surveillance with serial fetal growth assessments and third trimester fetal testing. I also reviewed the possibility of need for early delivery in those with poor glucose control or evidence of fetal growth abnormalities.    Recommendations (Please refer to [facility] guidelines):  Baseline evaluation (to be ordered by primary OB provider):  24 hour urine protein or urine P/C ratio, CBC, CMP  Maternal EKG; echocardiogram if BMI > 30 or EKG is abnormal  Maternal ophthalmic exam (if hasn't been performed in last year) and podiatry exam  TSH (if type 1 DM)  Hemoglobin A1C every  trimester   Diabetic education referral and counseling re: goal blood sugars (< 95 mg/dl for fasting, < 120 mg/dl for 2 hour postprandial)  Start/Continue to check blood sugars 4x daily  Fasting and 2-hour postprandial glucose monitoring.   Goals of fasting levels below 95 mg/dL and postprandial levels below 120 mg/dL.   Patient will send BS log weekly through portal on those weeks she is not seen in clinic  Medications:   Start low dose aspirin 81 mg daily at 12-16 weeks for preeclampsia risk reduction  1 mg folic acid daily  Insulin/Metformin-see below    Multiple dose injectable insulin  Patient's current regimen is:  Metformin 1000 mg bid  After consultation, the following adjustments were made:  Metformin was discontinued.  She was started on 14 units of NovoLog with each meal and 14 units of Novolin N at bedtime.  She was counseled on diet.  Since her father is diabetic she has some idea of carbohydrate counseling.  She should be scheduled with a dietitian.  She was told to eat 30 g of carbohydrate for breakfast and 50 g for lunch and dinner.  She works as  and has a rather hectic schedule making it difficult to eat and obtain blood sugars on a schedule.  For this reason the insulin regimen using short-acting before each meal gives her more flexibility.  Ultrasounds:  Nuchal translucency scan at 12-13 weeks  Targeted anatomy survey at 20 weeks  Serial growth ultrasounds q 4-6 weeks at 26-28 weeks  A fetal echocardiogram is recommended at 22-24 weeks with pediatric cardiology  Prenatal testing  Twice weekly BPP/ NST + AFV starting at 32 weeks. (Should be ordered and arranged by the patient's primary OB provider).    Delivery timin 0/7 to 38 and 6/7 weeks if under good control without comorbidities  37 0/7 to 37 and 6/7 weeks if longstanding diabetes or poorly controlled, polyhydramnios, EFW>90th percentile, or BMI >= 40  36 0/7 to 37 and 6/7 weeks if vascular complications, prior stillbirth or  other complicating conditions.  Recommend consideration of earlier delivery if IUGR, HTN, or other complications  Recommend offering  for delivery is EFW is 4500g or more near the time of delivery    Insulin management during labor and delivery  Give usual dose of intermediate acting (NPH) or long-acting insulin at bedtime  Hold morning dose of insulin or reduce to ½ dose   Patients who require insulin should be scheduled as the first available (7 am or 7:30 am)  given the need for NPO status  Check glucose every 2-4 hours in latent labor; hourly in active labor  If blood glucose is less than 70 mg/dl, change IVF to D5 ½ NS at rate of 100-150 cc/hr and monitor blood glucose hourly   IV infusion of regular insulin is recommended if glucose levels exceed 120 mg/dl  .  Postpartum management  Insulin should be reduced by 1/2 of the pre-delivery dose within the first 6-24 hours following delivery.  Patients who were well-controlled on oral regimens can often return to these following delivery.  Patients who are breastfeeding should be advised to have small snacks before breastfeeding to reduce the risk of hypoglycemia.  Patients should be referred to primary MD or Endocrinology for postpartum management.    The patient was advised to call for blood sugars less than 70 or greater than 200 prior to her next appointment. She was also advised that if she notes nausea/vomiting, inability to tolerate PO, or concerns about being able to take her insulin that she should contact her provider or present to the OB ED.  The patient was given an opportunity to ask questions about management and the diease process.  She expressed an understanding of and agreement to the above impression and plan. All questions were answered to her satisfaction.  She was given contact information to the [facility] clinic to address further concerns.

## 2024-01-29 ENCOUNTER — OFFICE VISIT (OUTPATIENT)
Dept: MATERNAL FETAL MEDICINE | Facility: CLINIC | Age: 29
End: 2024-01-29
Attending: OBSTETRICS & GYNECOLOGY
Payer: COMMERCIAL

## 2024-01-29 ENCOUNTER — PATIENT MESSAGE (OUTPATIENT)
Dept: MATERNAL FETAL MEDICINE | Facility: CLINIC | Age: 29
End: 2024-01-29

## 2024-01-29 ENCOUNTER — TELEPHONE (OUTPATIENT)
Dept: MATERNAL FETAL MEDICINE | Facility: CLINIC | Age: 29
End: 2024-01-29

## 2024-01-29 DIAGNOSIS — O24.111 TYPE 2 DIABETES MELLITUS AFFECTING PREGNANCY IN FIRST TRIMESTER, ANTEPARTUM: Primary | ICD-10-CM

## 2024-01-29 PROCEDURE — 1159F MED LIST DOCD IN RCRD: CPT | Mod: CPTII,95,, | Performed by: OBSTETRICS & GYNECOLOGY

## 2024-01-29 PROCEDURE — 1160F RVW MEDS BY RX/DR IN RCRD: CPT | Mod: CPTII,95,, | Performed by: OBSTETRICS & GYNECOLOGY

## 2024-01-29 PROCEDURE — 99213 OFFICE O/P EST LOW 20 MIN: CPT | Mod: 95,,, | Performed by: OBSTETRICS & GYNECOLOGY

## 2024-01-29 RX ORDER — ASPIRIN 81 MG/1
81 TABLET ORAL DAILY
Refills: 0 | COMMUNITY
Start: 2024-01-29 | End: 2024-11-04

## 2024-01-29 NOTE — TELEPHONE ENCOUNTER
Call was made to patient after BS Review with Dr. Hall to schedule follow up BS review in a week. Patient agreed with appointment on 2/5/24 at 8:00am with Dr. Pantoja for Virtual BS Review. Patient knows to upload BS Log before scheduled appointment.

## 2024-01-29 NOTE — PROGRESS NOTES
The patient location is: [facility]  The chief complaint leading to consultation is: DM complicating pregnancy    Visit type: audiovisual    Face to Face time with patient: 5  10 minutes of total time spent on the encounter, which includes face to face time and non-face to face time preparing to see the patient (eg, review of tests), Obtaining and/or reviewing separately obtained history, Documenting clinical information in the electronic or other health record, Independently interpreting results (not separately reported) and communicating results to the patient/family/caregiver, or Care coordination (not separately reported).         Each patient to whom he or she provides medical services by telemedicine is:  (1) informed of the relationship between the physician and patient and the respective role of any other health care provider with respect to management of the patient; and (2) notified that he or she may decline to receive medical services by telemedicine and may withdraw from such care at any time.    Notes:   Follow up consultation regarding diabetes in pregnancy provided today.  Risks of uncontrolled diabetes in pregnancy reviewed once again.  Blood glucose measurements assessed.  The 14 units of short-acting before meals has brought her post prandials into target range.  We will continue this.  The majority of her fasting blood sugars are elevated,  I did adjust her medications today.  I increased her NPH at bedtime from 14 to18.    The patient was given an opportunity to ask questions about management and the diease process.  She expressed an understanding of and agreement to the above impression and plan. All questions were answered to her satisfaction.  She was given contact information to the [facility] clinic to address further concerns.

## 2024-02-05 ENCOUNTER — PATIENT MESSAGE (OUTPATIENT)
Dept: MATERNAL FETAL MEDICINE | Facility: CLINIC | Age: 29
End: 2024-02-05

## 2024-02-05 ENCOUNTER — OFFICE VISIT (OUTPATIENT)
Dept: MATERNAL FETAL MEDICINE | Facility: CLINIC | Age: 29
End: 2024-02-05
Payer: COMMERCIAL

## 2024-02-05 DIAGNOSIS — O24.111 TYPE 2 DIABETES MELLITUS AFFECTING PREGNANCY IN FIRST TRIMESTER, ANTEPARTUM: Primary | ICD-10-CM

## 2024-02-05 PROCEDURE — 99214 OFFICE O/P EST MOD 30 MIN: CPT | Mod: 95,,, | Performed by: OBSTETRICS & GYNECOLOGY

## 2024-02-05 RX ORDER — INSULIN HUMAN 100 [IU]/ML
22 INJECTION, SUSPENSION SUBCUTANEOUS NIGHTLY
Qty: 20 ML | Refills: 12 | Status: SHIPPED | OUTPATIENT
Start: 2024-02-05 | End: 2024-05-14 | Stop reason: SDUPTHER

## 2024-02-05 NOTE — PROGRESS NOTES
The patient location is: home  The chief complaint leading to consultation is: T2DM    Visit type: audiovisual    Face to Face time with patient: 5 minutes  10 minutes of total time spent on the encounter, which includes face to face time and non-face to face time preparing to see the patient (eg, review of tests), Obtaining and/or reviewing separately obtained history, Documenting clinical information in the electronic or other health record, Independently interpreting results (not separately reported) and communicating results to the patient/family/caregiver, or Care coordination (not separately reported).     Each patient to whom he or she provides medical services by telemedicine is:  (1) informed of the relationship between the physician and patient and the respective role of any other health care provider with respect to management of the patient; and (2) notified that he or she may decline to receive medical services by telemedicine and may withdraw from such care at any time.    Notes:     Maternal Fetal Medicine follow up consult  Maxine Sanchez is a 29 y.o.  female with IUP at 15w5d who is seen in follow up consultation by [facility].  Problems addressed today:  T2 diabetes in pregnancy - BG check only    Interval history since last [facility] visit: no changes.  Current regimen: NPH 18u qhs, Novolog 14u TIDWM    BGs reviewed, see Media tab:  All fasting BGs elevated, range   Majority postprandials within range, few random elevations in the 120s    Recommendations:  See original [facility] note for full consultation and management recommendations regarding diabetes in pregnancy  Changes made today to insulin regimen: NPH 22u qhs, Novolog 14u TIDWM  Next [facility] appointment will be scheduled: In 1-2 weeks for BG check. In 4 weeks for BG check/fetal growth assessment.     Counseling:  We again discussed the importance of achieving blood sugar goals of fasting <95 and 2 hour postprandial <120 in order to decrease the risk of  adverse pregnancy outcomes.  Continue to evaluate BGs 4x/day  The patient was advised to call for blood sugars less than 70 or greater than 200 prior to her next appointment. She was also advised that if she notes nausea/vomiting, inability to tolerate PO, or concerns about being able to take medications that she should contact her provider or present to the OB ED.    Surveillance and delivery timing:  Growth scans every 4 weeks. An ultrasound for estimated fetal weight should be obtained within 3 weeks of anticipated delivery; if the EFW is >= 4500 grams, a  should be offered.    Pre-gestational diabetes:  Twice weekly  fetal surveillance is recommended at 32 weeks gestation (BPP alternating with NST)  Delivery timin 0/7 to 38 and 6/7 weeks if under good control without comorbidities  37 0/7 to 37 and 67 weeks if longstanding diabetes or poorly controlled, polyhydramnios, EFW>90th percentile, or BMI >= 40  36 0/7 to 37 and 6/7 weeks if vascular complications, prior stillbirth or other complicating conditions.  Recommend consideration of earlier delivery if IUGR, HTN, or other complications    See previous recommendations pertaining to intrapartum and postpartum insulin and glucose management guidelines.     Kalpana Pantoja MD  Maternal Fetal Medicine

## 2024-02-19 ENCOUNTER — OFFICE VISIT (OUTPATIENT)
Dept: MATERNAL FETAL MEDICINE | Facility: CLINIC | Age: 29
End: 2024-02-19
Payer: COMMERCIAL

## 2024-02-19 ENCOUNTER — PATIENT MESSAGE (OUTPATIENT)
Dept: MATERNAL FETAL MEDICINE | Facility: CLINIC | Age: 29
End: 2024-02-19

## 2024-02-19 DIAGNOSIS — O24.111 TYPE 2 DIABETES MELLITUS AFFECTING PREGNANCY IN FIRST TRIMESTER, ANTEPARTUM: Primary | ICD-10-CM

## 2024-02-19 PROCEDURE — 1160F RVW MEDS BY RX/DR IN RCRD: CPT | Mod: CPTII,95,, | Performed by: STUDENT IN AN ORGANIZED HEALTH CARE EDUCATION/TRAINING PROGRAM

## 2024-02-19 PROCEDURE — 99213 OFFICE O/P EST LOW 20 MIN: CPT | Mod: 95,,, | Performed by: STUDENT IN AN ORGANIZED HEALTH CARE EDUCATION/TRAINING PROGRAM

## 2024-02-19 PROCEDURE — 1159F MED LIST DOCD IN RCRD: CPT | Mod: CPTII,95,, | Performed by: STUDENT IN AN ORGANIZED HEALTH CARE EDUCATION/TRAINING PROGRAM

## 2024-02-19 NOTE — ASSESSMENT & PLAN NOTE
BGs reviewed, see Media tab:  All fasting BGs elevated, range   Majority postprandials within range, few random elevations in the 120s     Recommendations:  See original MFM note for full consultation and management recommendations regarding diabetes in pregnancy.  Changes made today to insulin regimen: NPH 26u qhs (increased), Novolog 14u TIDWM (the same)  See previous recommendations pertaining to intrapartum and postpartum insulin and glucose management guidelines.   Next [facility] appointment scheduled: In 2 weeks for BG check and ultrasound.     Counseling:  We again discussed the importance of achieving blood sugar goals of fasting <95 and 2 hour postprandial <120 in order to decrease the risk of adverse pregnancy outcomes.  Continue to evaluate BGs 4x/day  The patient was advised to call for blood sugars less than 70 or greater than 200 prior to her next appointment. She was also advised that if she notes nausea/vomiting, inability to tolerate PO, or concerns about being able to take medications that she should contact her provider or present to the OB ED.     Surveillance and delivery timing:  Growth scans every 4 weeks. An ultrasound for estimated fetal weight should be obtained within 3 weeks of anticipated delivery; if the EFW is >= 4500 grams, a  should be offered.     Pre-gestational diabetes:  Twice weekly  fetal surveillance is recommended at 32 weeks gestation (BPP alternating with NST)    Delivery timin 0/7 to 37 and 67 weeks if longstanding diabetes or poorly controlled, polyhydramnios, EFW>90th percentile, or BMI >= 40 (current)  36 0/7 to 37 and 6/7 weeks if vascular complications, prior stillbirth or other complicating conditions.  Recommend consideration of earlier delivery if IUGR, HTN, or other complications

## 2024-02-19 NOTE — PROGRESS NOTES
"  The patient location is: [place]  The chief complaint leading to consultation is: glucose check, T2DM    Visit type: audiovisual    Face to Face time with patient: 6 minutes  15 minutes of total time spent on the encounter, which includes face to face time and non-face to face time preparing to see the patient (eg, review of tests), Obtaining and/or reviewing separately obtained history, Documenting clinical information in the electronic or other health record, Independently interpreting results (not separately reported) and communicating results to the patient/family/caregiver, or Care coordination (not separately reported).     Each patient to whom he or she provides medical services by telemedicine is:  (1) informed of the relationship between the physician and patient and the respective role of any other health care provider with respect to management of the patient; and (2) notified that he or she may decline to receive medical services by telemedicine and may withdraw from such care at any time.    Notes:     Maternal Fetal Medicine  Follow Up Consult    SUBJECTIVE:       Maxine Sanchez is a 29 y.o.  female with IUP at 17w5d who is seen in follow up consultation by [facility] for diabetes management.    Pregnancy complications include:   Problem   Type 2 Diabetes Mellitus Affecting Pregnancy in First Trimester, Antepartum       Previous notes reviewed.   No changes to medical, surgical, family, social, or obstetric history.    Interval history since last MFM visit: no changes    Medications:  Current Outpatient Medications   Medication Instructions    aspirin (ECOTRIN) 81 mg, Oral, Daily    HumuLIN N NPH U-100 Insulin 22 Units, Subcutaneous, Nightly    insulin syringe-needle U-100 0.5 mL 31 gauge x 5/16" Syrg Use 4 (four) times daily.    loratadine (CLARITIN) 10 mg, Oral, Daily    NovoLOG U-100 Insulin aspart 14 Units, Subcutaneous, 3 times daily before meals    prenatal vit no.124/iron/folic (PRENATAL VITAMIN " ORAL) Oral    valACYclovir (VALTREX) 4,000 mg, Oral, Once        OBJECTIVE:   There were no vitals taken for this visit.    Home blood glucose log:  See Media tab  Fastings: 12/15 elevated (max 112)  PB: 0/14 elevated  PL: 2/14 elevated (max 129)  PD: 2/14 elevated (max 125)    ASSESSMENT/PLAN:     29 y.o.  female with IUP at 17w5d for blood glucose monitoring    Counseling:  I reviewed diet, exercise, blood sugar monitoring, and compliance with regimen with patient today.    Type 2 diabetes mellitus affecting pregnancy in first trimester, antepartum  BGs reviewed, see Media tab:  All fasting BGs elevated, range   Majority postprandials within range, few random elevations in the 120s     Recommendations:  See original [facility] note for full consultation and management recommendations regarding diabetes in pregnancy.  Changes made today to insulin regimen: NPH 26u qhs (increased), Novolog 14u TIDWM (the same)  See previous recommendations pertaining to intrapartum and postpartum insulin and glucose management guidelines.   Next M appointment scheduled: In 2 weeks for BG check and ultrasound.     Counseling:  We again discussed the importance of achieving blood sugar goals of fasting <95 and 2 hour postprandial <120 in order to decrease the risk of adverse pregnancy outcomes.  Continue to evaluate BGs 4x/day  The patient was advised to call for blood sugars less than 70 or greater than 200 prior to her next appointment. She was also advised that if she notes nausea/vomiting, inability to tolerate PO, or concerns about being able to take medications that she should contact her provider or present to the OB ED.     Surveillance and delivery timing:  Growth scans every 4 weeks. An ultrasound for estimated fetal weight should be obtained within 3 weeks of anticipated delivery; if the EFW is >= 4500 grams, a  should be offered.     Pre-gestational diabetes:  Twice weekly  fetal surveillance is  recommended at 32 weeks gestation (BPP alternating with NST)    Delivery timin 0/7 to 37 and 67 weeks if longstanding diabetes or poorly controlled, polyhydramnios, EFW>90th percentile, or BMI >= 40 (current)  36 0/7 to 37 and 6/7 weeks if vascular complications, prior stillbirth or other complicating conditions.  Recommend consideration of earlier delivery if IUGR, HTN, or other complications    Follow in 2 weeks for MFM visit/US    BMary Amaral MD  Maternal Fetal Medicine

## 2024-03-01 ENCOUNTER — OFFICE VISIT (OUTPATIENT)
Dept: URGENT CARE | Facility: CLINIC | Age: 29
End: 2024-03-01
Payer: COMMERCIAL

## 2024-03-01 VITALS
TEMPERATURE: 99 F | SYSTOLIC BLOOD PRESSURE: 110 MMHG | RESPIRATION RATE: 19 BRPM | WEIGHT: 258 LBS | OXYGEN SATURATION: 98 % | HEIGHT: 65 IN | BODY MASS INDEX: 42.99 KG/M2 | DIASTOLIC BLOOD PRESSURE: 68 MMHG | HEART RATE: 99 BPM

## 2024-03-01 DIAGNOSIS — R09.81 NASAL CONGESTION: ICD-10-CM

## 2024-03-01 DIAGNOSIS — R05.8 DRY COUGH: ICD-10-CM

## 2024-03-01 DIAGNOSIS — R89.4 INFLUENZA A VIRUS NOT DETECTED: ICD-10-CM

## 2024-03-01 DIAGNOSIS — J02.9 SORE THROAT: Primary | ICD-10-CM

## 2024-03-01 DIAGNOSIS — Z20.822 COVID-19 VIRUS NOT DETECTED: ICD-10-CM

## 2024-03-01 LAB
CTP QC/QA: YES
FLUAV AG NPH QL: NEGATIVE
FLUBV AG NPH QL: NEGATIVE
S PYO RRNA THROAT QL PROBE: NEGATIVE
SARS-COV-2 AG RESP QL IA.RAPID: NEGATIVE

## 2024-03-01 PROCEDURE — 87804 INFLUENZA ASSAY W/OPTIC: CPT | Mod: QW,,,

## 2024-03-01 PROCEDURE — 87880 STREP A ASSAY W/OPTIC: CPT | Mod: QW,,,

## 2024-03-01 PROCEDURE — 99214 OFFICE O/P EST MOD 30 MIN: CPT | Mod: S$GLB,,,

## 2024-03-01 PROCEDURE — 87811 SARS-COV-2 COVID19 W/OPTIC: CPT | Mod: QW,S$GLB,,

## 2024-03-01 NOTE — PROGRESS NOTES
"Subjective:      Patient ID: Maxine Sanchez is a 29 y.o. female.    Vitals:  height is 5' 5" (1.651 m) and weight is 117 kg (258 lb). Her oral temperature is 99 °F (37.2 °C). Her blood pressure is 110/68 and her pulse is 99. Her respiration is 19 and oxygen saturation is 98%.     Chief Complaint: URI (19 weeks with child)    In clinic with a chief complaint of dried nasal mucosa, sore throat, and headache for 1 day.  Patient was 19 weeks pregnant.  Has been using Claritin.  Contacted Maternal-Fetal Medicine, was instructed to test due to symptoms.  Denies fever.    URI   This is a new problem. The current episode started yesterday. The problem has been gradually worsening. There has been no fever. Associated symptoms include congestion, coughing, headaches, a plugged ear sensation, sneezing and a sore throat. Associated symptoms comments: Post nasal drip. She has tried antihistamine for the symptoms. The treatment provided no relief.       Constitution: Negative for fever.   HENT:  Positive for congestion and sore throat.    Neck: neck negative.   Cardiovascular: Negative.    Eyes: Negative.    Respiratory:  Positive for cough.    Gastrointestinal: Negative.    Endocrine: negative.   Genitourinary: Negative.    Musculoskeletal: Negative.    Skin: Negative.    Allergic/Immunologic: Positive for sneezing and immunizations up-to-date.   Neurological:  Positive for headaches.   Hematologic/Lymphatic: Negative.    Psychiatric/Behavioral: Negative.        Objective:     Physical Exam   Constitutional: She is oriented to person, place, and time. She appears well-developed. She is cooperative.   HENT:   Head: Normocephalic and atraumatic.   Ears:   Right Ear: Hearing and external ear normal.   Left Ear: Hearing and external ear normal.   Nose: Nose normal. No mucosal edema or nasal deformity. No epistaxis. Right sinus exhibits no maxillary sinus tenderness and no frontal sinus tenderness. Left sinus exhibits no maxillary " sinus tenderness and no frontal sinus tenderness.   Mouth/Throat: Uvula is midline, oropharynx is clear and moist and mucous membranes are normal. Mucous membranes are moist. No trismus in the jaw. Normal dentition. No uvula swelling. Oropharynx is clear.   Eyes: Conjunctivae and lids are normal. Pupils are equal, round, and reactive to light. Extraocular movement intact   Neck: Trachea normal and phonation normal. Neck supple.   Cardiovascular: Normal rate, regular rhythm, normal heart sounds and normal pulses.   Pulmonary/Chest: Effort normal and breath sounds normal.   Abdominal: Normal appearance.   Musculoskeletal: Normal range of motion.         General: Normal range of motion.   Neurological: no focal deficit. She is alert, oriented to person, place, and time and at baseline. She exhibits normal muscle tone.   Skin: Skin is warm, dry and intact. Capillary refill takes 2 to 3 seconds.   Psychiatric: Her speech is normal and behavior is normal. Mood, judgment and thought content normal.   Nursing note and vitals reviewed.      Assessment:     1. Sore throat    2. Dry cough    3. Influenza A virus not detected    4. COVID-19 virus not detected    5. Nasal congestion        Plan:       Sore throat  -     POCT Influenza A/B Rapid Antigen  -     POCT rapid strep A  -     SARS Coronavirus 2 Antigen, POCT Manual Read    Dry cough  -     POCT Influenza A/B Rapid Antigen  -     SARS Coronavirus 2 Antigen, POCT Manual Read    Influenza A virus not detected    COVID-19 virus not detected    Nasal congestion

## 2024-03-04 ENCOUNTER — PROCEDURE VISIT (OUTPATIENT)
Dept: MATERNAL FETAL MEDICINE | Facility: CLINIC | Age: 29
End: 2024-03-04
Payer: COMMERCIAL

## 2024-03-04 ENCOUNTER — OFFICE VISIT (OUTPATIENT)
Dept: MATERNAL FETAL MEDICINE | Facility: CLINIC | Age: 29
End: 2024-03-04
Attending: OBSTETRICS & GYNECOLOGY
Payer: COMMERCIAL

## 2024-03-04 VITALS
BODY MASS INDEX: 43.34 KG/M2 | DIASTOLIC BLOOD PRESSURE: 60 MMHG | WEIGHT: 260.13 LBS | HEIGHT: 65 IN | SYSTOLIC BLOOD PRESSURE: 135 MMHG

## 2024-03-04 DIAGNOSIS — Z36.89 ENCOUNTER FOR ULTRASOUND TO ASSESS FETAL GROWTH: Primary | ICD-10-CM

## 2024-03-04 DIAGNOSIS — Z36.89 ENCOUNTER FOR FETAL ANATOMIC SURVEY: ICD-10-CM

## 2024-03-04 DIAGNOSIS — O24.111 TYPE 2 DIABETES MELLITUS AFFECTING PREGNANCY IN FIRST TRIMESTER, ANTEPARTUM: Primary | ICD-10-CM

## 2024-03-04 PROCEDURE — 3075F SYST BP GE 130 - 139MM HG: CPT | Mod: CPTII,S$GLB,, | Performed by: OBSTETRICS & GYNECOLOGY

## 2024-03-04 PROCEDURE — 3078F DIAST BP <80 MM HG: CPT | Mod: CPTII,S$GLB,, | Performed by: OBSTETRICS & GYNECOLOGY

## 2024-03-04 PROCEDURE — 76811 OB US DETAILED SNGL FETUS: CPT | Mod: S$GLB,,, | Performed by: OBSTETRICS & GYNECOLOGY

## 2024-03-04 PROCEDURE — 1159F MED LIST DOCD IN RCRD: CPT | Mod: CPTII,S$GLB,, | Performed by: OBSTETRICS & GYNECOLOGY

## 2024-03-04 PROCEDURE — 3008F BODY MASS INDEX DOCD: CPT | Mod: CPTII,S$GLB,, | Performed by: OBSTETRICS & GYNECOLOGY

## 2024-03-04 PROCEDURE — 99999 PR PBB SHADOW E&M-EST. PATIENT-LVL III: CPT | Mod: PBBFAC,,, | Performed by: OBSTETRICS & GYNECOLOGY

## 2024-03-04 PROCEDURE — 99214 OFFICE O/P EST MOD 30 MIN: CPT | Mod: S$GLB,,, | Performed by: OBSTETRICS & GYNECOLOGY

## 2024-03-04 NOTE — PROGRESS NOTES
Follow up consultation regarding diabetes in pregnancy provided today.  Risks of uncontrolled diabetes in pregnancy reviewed once again.  Blood glucose measurements assessed.      I did adjust her medications today.  Previously she has had 4 units increases in her nighttime NPH with little effect.  I increased her 26 units bedtime dose of NPH to 34 units.    Results of today's ultrasound discussed with patient.   We will see her back at the time of her fetal echocardiogram in 3 weeks for follow-up blood sugar check and then again at 26 weeks after her follow-up anatomy scan.  The patient was given an opportunity to ask questions about management and the diease process.  She expressed an understanding of and agreement to the above impression and plan. All questions were answered to her satisfaction.  She was given contact information to the [facility] clinic to address further concerns.

## 2024-03-25 ENCOUNTER — OFFICE VISIT (OUTPATIENT)
Dept: PEDIATRIC CARDIOLOGY | Facility: CLINIC | Age: 29
End: 2024-03-25
Attending: PEDIATRICS
Payer: COMMERCIAL

## 2024-03-25 ENCOUNTER — LAB VISIT (OUTPATIENT)
Dept: LAB | Facility: OTHER | Age: 29
End: 2024-03-25
Attending: OBSTETRICS & GYNECOLOGY
Payer: COMMERCIAL

## 2024-03-25 ENCOUNTER — PATIENT MESSAGE (OUTPATIENT)
Dept: MATERNAL FETAL MEDICINE | Facility: CLINIC | Age: 29
End: 2024-03-25

## 2024-03-25 ENCOUNTER — OFFICE VISIT (OUTPATIENT)
Dept: MATERNAL FETAL MEDICINE | Facility: CLINIC | Age: 29
End: 2024-03-25
Payer: COMMERCIAL

## 2024-03-25 ENCOUNTER — CLINICAL SUPPORT (OUTPATIENT)
Dept: PEDIATRIC CARDIOLOGY | Facility: CLINIC | Age: 29
End: 2024-03-25
Payer: COMMERCIAL

## 2024-03-25 VITALS
BODY MASS INDEX: 43.43 KG/M2 | DIASTOLIC BLOOD PRESSURE: 66 MMHG | HEIGHT: 65 IN | WEIGHT: 260.69 LBS | HEART RATE: 87 BPM | SYSTOLIC BLOOD PRESSURE: 123 MMHG

## 2024-03-25 DIAGNOSIS — O24.111 TYPE 2 DIABETES MELLITUS AFFECTING PREGNANCY IN FIRST TRIMESTER, ANTEPARTUM: ICD-10-CM

## 2024-03-25 DIAGNOSIS — Z36.83 ENCOUNTER FOR SCREENING FOR CONGENITAL CARDIAC ABNORMALITIES IN FETUS: ICD-10-CM

## 2024-03-25 DIAGNOSIS — O24.112 PREGNANCY COMPLICATED BY PRE-EXISTING TYPE 2 DIABETES, SECOND TRIMESTER: ICD-10-CM

## 2024-03-25 DIAGNOSIS — O99.212 OTHER OBESITY AFFECTING PREGNANCY IN SECOND TRIMESTER: ICD-10-CM

## 2024-03-25 DIAGNOSIS — E66.8 OTHER OBESITY AFFECTING PREGNANCY IN SECOND TRIMESTER: ICD-10-CM

## 2024-03-25 DIAGNOSIS — O24.112 PREGNANCY COMPLICATED BY PRE-EXISTING TYPE 2 DIABETES, SECOND TRIMESTER: Primary | ICD-10-CM

## 2024-03-25 DIAGNOSIS — Z3A.22 22 WEEKS GESTATION OF PREGNANCY: Primary | ICD-10-CM

## 2024-03-25 DIAGNOSIS — O24.111 TYPE 2 DIABETES MELLITUS AFFECTING PREGNANCY IN FIRST TRIMESTER, ANTEPARTUM: Primary | ICD-10-CM

## 2024-03-25 LAB
ESTIMATED AVG GLUCOSE: 97 MG/DL (ref 68–131)
HBA1C MFR BLD: 5 % (ref 4–5.6)

## 2024-03-25 PROCEDURE — 3074F SYST BP LT 130 MM HG: CPT | Mod: CPTII,S$GLB,, | Performed by: PEDIATRICS

## 2024-03-25 PROCEDURE — 3044F HG A1C LEVEL LT 7.0%: CPT | Mod: CPTII,S$GLB,, | Performed by: PEDIATRICS

## 2024-03-25 PROCEDURE — 3008F BODY MASS INDEX DOCD: CPT | Mod: CPTII,S$GLB,, | Performed by: PEDIATRICS

## 2024-03-25 PROCEDURE — 99999 PR PBB SHADOW E&M-EST. PATIENT-LVL I: CPT | Mod: PBBFAC,,,

## 2024-03-25 PROCEDURE — 36415 COLL VENOUS BLD VENIPUNCTURE: CPT | Performed by: OBSTETRICS & GYNECOLOGY

## 2024-03-25 PROCEDURE — 99204 OFFICE O/P NEW MOD 45 MIN: CPT | Mod: 25,S$GLB,, | Performed by: PEDIATRICS

## 2024-03-25 PROCEDURE — 1160F RVW MEDS BY RX/DR IN RCRD: CPT | Mod: CPTII,S$GLB,, | Performed by: PEDIATRICS

## 2024-03-25 PROCEDURE — 76827 ECHO EXAM OF FETAL HEART: CPT | Mod: S$GLB,,, | Performed by: PEDIATRICS

## 2024-03-25 PROCEDURE — 99999 PR PBB SHADOW E&M-EST. PATIENT-LVL III: CPT | Mod: PBBFAC,,, | Performed by: PEDIATRICS

## 2024-03-25 PROCEDURE — 93325 DOPPLER ECHO COLOR FLOW MAPG: CPT | Mod: S$GLB,,, | Performed by: PEDIATRICS

## 2024-03-25 PROCEDURE — 76825 ECHO EXAM OF FETAL HEART: CPT | Mod: S$GLB,,, | Performed by: PEDIATRICS

## 2024-03-25 PROCEDURE — 1159F MED LIST DOCD IN RCRD: CPT | Mod: CPTII,S$GLB,, | Performed by: PEDIATRICS

## 2024-03-25 PROCEDURE — 99214 OFFICE O/P EST MOD 30 MIN: CPT | Mod: 95,,, | Performed by: OBSTETRICS & GYNECOLOGY

## 2024-03-25 PROCEDURE — 83036 HEMOGLOBIN GLYCOSYLATED A1C: CPT | Performed by: OBSTETRICS & GYNECOLOGY

## 2024-03-25 PROCEDURE — 3078F DIAST BP <80 MM HG: CPT | Mod: CPTII,S$GLB,, | Performed by: PEDIATRICS

## 2024-03-25 RX ORDER — METFORMIN HYDROCHLORIDE 500 MG/1
500 TABLET ORAL
Qty: 90 TABLET | Refills: 3 | Status: SHIPPED | OUTPATIENT
Start: 2024-03-25 | End: 2025-03-25

## 2024-03-25 NOTE — PROGRESS NOTES
"I had the pleasure of evaluating Maxine Sanchez who is now 29 y.o.  and carrying her 1st pregnancy at 22 5/7 weeks gestation. She was referred for evaluation of the fetal heart due to increased risks for congenital heart disease associated with insulin dependent diabetes and difficulty demonstrating the fetal heart.       Current Outpatient Medications:     aspirin (ECOTRIN) 81 MG EC tablet, Take 1 tablet (81 mg total) by mouth once daily., Disp: , Rfl: 0    insulin aspart U-100 (NOVOLOG U-100 INSULIN ASPART) 100 unit/mL injection, Inject 14 Units into the skin 3 (three) times daily before meals., Disp: 30 mL, Rfl: 12    insulin NPH (HUMULIN N NPH U-100 INSULIN) 100 unit/mL injection, Inject 22 Units into the skin every evening. (Patient taking differently: Inject 34 Units into the skin every evening.), Disp: 20 mL, Rfl: 12    insulin syringe-needle U-100 0.5 mL 31 gauge x 5/16" Syrg, Use 4 (four) times daily., Disp: 120 each, Rfl: 12    loratadine (CLARITIN) 10 mg tablet, Take 10 mg by mouth once daily., Disp: , Rfl:     prenatal vit no.124/iron/folic (PRENATAL VITAMIN ORAL), Take by mouth., Disp: , Rfl:     valACYclovir (VALTREX) 1000 MG tablet, Take 4,000 mg by mouth once., Disp: , Rfl:   Review of patient's allergies indicates:  No Known Allergies    Maternal factors increasing risk for congenital heart disease: As noted above with a history of psoriasis treated with a biological agent discontinued before pregnancy. There is no A1c available in her chart.  Paternal factors increasing risk for congenital heart disease: Unremarkable.    FETAL ECHOCARDIOGRAM (preliminary):  Study technically limited by available acoustic windows-  Color Doppler imaging in several views suggests but not diagnostic for very small muscular VSD.  Otherwise normal fetal cardiac connections and function for estimated gestational age in views available.  (Full report in electronic medical record)    I reviewed the strengths and weaknesses " of fetal echocardiography including the insufficient sensitivity to rule out many septal defects, anomalies of pulmonary and systemic veins, arch anomalies, and some valvar abnormalities. I also discussed that  resolution of the ductus arteriosus and foramen ovale (normal fetal structures) cannot be assured by fetal evaluation. Finally, I reviewed today's echocardiogram that was complicated by the available acoustic windows.  There is a color Doppler signal suggesting the possibility of a small muscular ventricular septal defect that I believe most likely is artifact but otherwise this study demonstrates normal anatomical connections and function for the estimated gestational age. With the imaging today, I believe there is reasonable probability that this infant will be born with a normal heart.  If there is indeed a small muscular VSD, it should not be hemodynamically significant.  I do not think that further efforts to image the fetus in utero are likely to be successful and with the risk factors associated with maternal diabetes, I believe it would be appropriate to plan on an elective echocardiogram to be performed after the infant is delivered.  The current plan is for delivery at [facility] and we can accomplish this echocardiogram utilizing the telemedicine echo service that we provided that nursery.  Preferably, this study will be obtained at least 24 hours after delivery in order to allow for transitional physiology to proceed.  This study should be performed promptly if there are any signs of clinical distress or the physical examination is abnormal.    I reviewed the concerns related to maternal diabetes. With normal fetal anatomy and function at this stage of development, the primary concern is the possibility of developing hypertrophic cardiomyopathy if there is difficulty controlling the maternal glucose and insulin. I carefully reviewed the possibility of this problem in the   period, the problems that it could cause and the high probability that it would regress after the infant is no longer exposed to the maternal glucose and insulin. I also reinforced the need to regulate the insulin and glucose carefully during the remainder of the pregnancy to minimize the risk for this complication.     Family, I reviewed the diagnosis of a muscular ventricular septal defect with the family.  If there is in fact that small muscular VSD, I would expect a high probability that it would resolve spontaneously.  I did review the natural history of ventricular septal defects particularly when present in the muscular septum.  There has always a possibility that it would be large enough to cause some physiological problems during the first few months of life as the pulmonary vascular resistance falls however, I do not think that this defect is large enough to produce any symptoms.  I reviewed the diagnosis utilizing the American [employer] Association website and the diagrams available there.    I answered all the parent's questions. I also provided an information sheet reviewing the fetal physiology and how this compares to normal  physiology. This  also includes a reiteration of the limitations of fetal echocardiography that I have discussed today. In addition, I suggested that they review the information related to fetal echocardiography available at the American [employer] Association website and provided directions for accessing this site.  They should also review the information at that website related to ventricular septal defect.  I have not scheduled another fetal evaluation; however, if questions arise later during gestation or after delivery, I would be happy to assist in any way. MsMary Sanchez is comfortable with the plan.      RECOMMENDATIONS:  Elective evaluation of the infant preferably after 24 hours to allow for normal  transitional physiology to proceed and before discharge from  the nursery (telemedicine echo if delivery at [facility] as anticipated).  Sooner if there are any signs of hemodynamic compromise.        45 minutes of total time spent on the encounter, which includes face to face time and non-face to face time preparing to see the patient (eg, review of tests), obtaining and/or reviewing separately obtained history, documenting clinical information in the electronic or other health record, independently interpreting results (not separately reported) and communicating results to the patient/family/caregiver, or care coordination (not separately reported).

## 2024-03-25 NOTE — PROGRESS NOTES
The patient location is: home  The chief complaint leading to consultation is: BS check    Visit type: audiovisual    Face to Face time with patient: 12  25 minutes of total time spent on the encounter, which includes face to face time and non-face to face time preparing to see the patient (eg, review of tests), Obtaining and/or reviewing separately obtained history, Documenting clinical information in the electronic or other health record, Independently interpreting results (not separately reported) and communicating results to the patient/family/caregiver, or Care coordination (not separately reported).         Each patient to whom he or she provides medical services by telemedicine is:  (1) informed of the relationship between the physician and patient and the respective role of any other health care provider with respect to management of the patient; and (2) notified that he or she may decline to receive medical services by telemedicine and may withdraw from such care at any time.    Notes:       Maternal Fetal Medicine follow up consult    SUBJECTIVE:     Maxine Sanchez is a 29 y.o.  female with IUP at 22w5d who is seen in follow up consultation by [facility].  Pregnancy complications include:   Problem   Pregnancy Complicated By Pre-Existing Type 2 Diabetes, Second Trimester       Previous notes reviewed.   No changes to medical, surgical, family, social, or obstetric history.    Interval history since last M visit: Doing well, no complaints.    Care team members:  Dr. Momin - Primary OB     OBJECTIVE:   There were no vitals taken for this visit.    Deferred-virtual visit Physical Exam    Significant labs/imaging:  BS logs reviewed.    ASSESSMENT/PLAN:     29 y.o.  female with IUP at 22w5d    Pregnancy complicated by pre-existing type 2 diabetes, second trimester  F/u for type 2 DM in pregnancy.  Dr. Hall adjusted her insulin last visit.  Taking 34 units NPH at bedtime, aspart 14 units TID with  meals.  BS log reviewed.  Post meal BS majority in range.  FBS remain elevated > 95.    Discussed two options:  Continue to up-titrate insulin NPH  V.  Add metformin 500 mg with dinner  Patient has taken metformin before and done well. Would like to re-start. Reviewed may need to decrease some insulin doses as metformin starts to take effect over next week.  Patient to check 0200 blood sugars and notify us if getting low.    F/u BS check in 7-10 days.    Fetal echo today. To have A1C drawn after (ordered).   Patient has metformin at home. She will check dose and notify me if needs new prescription.

## 2024-03-25 NOTE — ASSESSMENT & PLAN NOTE
F/u for type 2 DM in pregnancy.  Dr. Hall adjusted her insulin last visit.  Taking 34 units NPH at bedtime, aspart 14 units TID with meals.  BS log reviewed.  Post meal BS majority in range.  FBS remain elevated > 95.    Discussed two options:  Continue to up-titrate insulin NPH  V.  Add metformin 500 mg with dinner  Patient has taken metformin before and done well. Would like to re-start. Reviewed may need to decrease some insulin doses as metformin starts to take effect over next week.  Patient to check 0200 blood sugars and notify us if getting low.    F/u BS check in 7-10 days.    Fetal echo today. To have A1C drawn after (ordered).   Patient has metformin at home. She will check dose and notify me if needs new prescription.

## 2024-03-25 NOTE — LETTER
March 26, 2024        Ed Momin MD  [address] [facility]  Luciana Ponce [place] [facility] [address]             [facility] - Maternal Fetal Medicine ([place]) [address] [facility]  [place] [address]  Phone: [phone]  Fax: [phone]   Patient: Maxine Sanchez   MR Number: [id number]   YOB: 1995   Date of Visit: 3/25/2024       Dear Dr. Momin:    Thank you for referring Maxine Sanchez to me for evaluation. Attached you will find relevant portions of my assessment and plan of care.    If you have questions, please do not hesitate to call me. I look forward to following Maxine Sanchez along with you.    Sincerely,      Calixto Moreno MD            CC  No Recipients    Enclosure

## 2024-03-26 PROBLEM — Z36.83 ENCOUNTER FOR SCREENING FOR CONGENITAL CARDIAC ABNORMALITIES IN FETUS: Status: ACTIVE | Noted: 2024-03-26

## 2024-03-26 PROBLEM — O99.212 OBESITY AFFECTING PREGNANCY IN SECOND TRIMESTER: Status: ACTIVE | Noted: 2024-03-26

## 2024-03-26 PROBLEM — Z3A.22 22 WEEKS GESTATION OF PREGNANCY: Status: ACTIVE | Noted: 2024-03-26

## 2024-04-08 ENCOUNTER — PATIENT MESSAGE (OUTPATIENT)
Dept: MATERNAL FETAL MEDICINE | Facility: CLINIC | Age: 29
End: 2024-04-08

## 2024-04-08 ENCOUNTER — OFFICE VISIT (OUTPATIENT)
Dept: MATERNAL FETAL MEDICINE | Facility: CLINIC | Age: 29
End: 2024-04-08
Payer: COMMERCIAL

## 2024-04-08 DIAGNOSIS — O24.112 PREGNANCY COMPLICATED BY PRE-EXISTING TYPE 2 DIABETES, SECOND TRIMESTER: Primary | ICD-10-CM

## 2024-04-08 PROCEDURE — 3044F HG A1C LEVEL LT 7.0%: CPT | Mod: CPTII,95,, | Performed by: OBSTETRICS & GYNECOLOGY

## 2024-04-08 PROCEDURE — 99213 OFFICE O/P EST LOW 20 MIN: CPT | Mod: 95,,, | Performed by: OBSTETRICS & GYNECOLOGY

## 2024-04-08 NOTE — PROGRESS NOTES
The patient location is: home  The chief complaint leading to consultation is: BS check    Visit type: audio only for me (personal emergency), video for patient    Face to Face time with patient: 10  20 minutes of total time spent on the encounter, which includes face to face time and non-face to face time preparing to see the patient (eg, review of tests), Obtaining and/or reviewing separately obtained history, Documenting clinical information in the electronic or other health record, Independently interpreting results (not separately reported) and communicating results to the patient/family/caregiver, or Care coordination (not separately reported).         Each patient to whom he or she provides medical services by telemedicine is:  (1) informed of the relationship between the physician and patient and the respective role of any other health care provider with respect to management of the patient; and (2) notified that he or she may decline to receive medical services by telemedicine and may withdraw from such care at any time.    Notes:       Maternal Fetal Medicine follow up consult    SUBJECTIVE:     Maxine Sanchez is a 29 y.o.  female with IUP at 24w5d who is seen in follow up consultation by [facility].  Pregnancy complications include:   Problem   Pregnancy Complicated By Pre-Existing Type 2 Diabetes, Second Trimester       Previous notes reviewed.   No changes to medical, surgical, family, social, or obstetric history.    Interval history since last MFM visit: Doing well. Feeling more movement. No concerns. Tolerating metformin well.    Medications reviewed.    Care team members:  Dr. Momin - Primary OB     OBJECTIVE:   There were no vitals taken for this visit.    Deferred (Virtual) Physical Exam    Ultrasound performed. See viewpoint for full ultrasound report.    Significant labs/imaging:  BS log reviewed, A1C 5.0.    ASSESSMENT/PLAN:     29 y.o.  female with IUP at 24w5d    Pregnancy complicated by  pre-existing type 2 diabetes, second trimester  F/u for type 2 DM  Current regimen: NPH 34 units at bedtime, aspart 14 units TID with meals, metformin 500 mg with dinner.  BS log reviewed. Fastings mildly elevated but improving.  Lab Results   Component Value Date    HGBA1C 5.0 03/25/2024   Overall, excellent control.  2 am blood sugars not concerning for hypoglycemia. Can stop checking 2 am blood sugars.  Will increase insulin NPH to 36 units given mildly elevated fasting blood sugars.  Notify us if concerns/issues prior to next appointment.  Next visit 4/15 for US + MD visit with me.

## 2024-04-08 NOTE — ASSESSMENT & PLAN NOTE
F/u for type 2 DM  Current regimen: NPH 34 units at bedtime, aspart 14 units TID with meals, metformin 500 mg with dinner.  BS log reviewed. Fastings mildly elevated but improving.  Lab Results   Component Value Date    HGBA1C 5.0 03/25/2024   Overall, excellent control.  2 am blood sugars not concerning for hypoglycemia. Can stop checking 2 am blood sugars.  Will increase insulin NPH to 36 units given mildly elevated fasting blood sugars.  Notify us if concerns/issues prior to next appointment.  Next visit 4/15 for US + MD visit with me.

## 2024-04-15 ENCOUNTER — OFFICE VISIT (OUTPATIENT)
Dept: MATERNAL FETAL MEDICINE | Facility: CLINIC | Age: 29
End: 2024-04-15
Payer: COMMERCIAL

## 2024-04-15 ENCOUNTER — PROCEDURE VISIT (OUTPATIENT)
Dept: MATERNAL FETAL MEDICINE | Facility: CLINIC | Age: 29
End: 2024-04-15
Payer: COMMERCIAL

## 2024-04-15 VITALS
SYSTOLIC BLOOD PRESSURE: 132 MMHG | HEIGHT: 65 IN | WEIGHT: 260.13 LBS | DIASTOLIC BLOOD PRESSURE: 75 MMHG | BODY MASS INDEX: 43.34 KG/M2

## 2024-04-15 DIAGNOSIS — O24.111 TYPE 2 DIABETES MELLITUS AFFECTING PREGNANCY IN FIRST TRIMESTER, ANTEPARTUM: Primary | ICD-10-CM

## 2024-04-15 DIAGNOSIS — O24.112 PREGNANCY COMPLICATED BY PRE-EXISTING TYPE 2 DIABETES, SECOND TRIMESTER: Primary | ICD-10-CM

## 2024-04-15 DIAGNOSIS — Z36.89 ENCOUNTER FOR ULTRASOUND TO ASSESS FETAL GROWTH: ICD-10-CM

## 2024-04-15 DIAGNOSIS — O44.40 LOW-LYING PLACENTA: ICD-10-CM

## 2024-04-15 PROCEDURE — 76816 OB US FOLLOW-UP PER FETUS: CPT | Mod: S$GLB,,, | Performed by: OBSTETRICS & GYNECOLOGY

## 2024-04-15 PROCEDURE — 3008F BODY MASS INDEX DOCD: CPT | Mod: CPTII,S$GLB,, | Performed by: OBSTETRICS & GYNECOLOGY

## 2024-04-15 PROCEDURE — 3075F SYST BP GE 130 - 139MM HG: CPT | Mod: CPTII,S$GLB,, | Performed by: OBSTETRICS & GYNECOLOGY

## 2024-04-15 PROCEDURE — 3078F DIAST BP <80 MM HG: CPT | Mod: CPTII,S$GLB,, | Performed by: OBSTETRICS & GYNECOLOGY

## 2024-04-15 PROCEDURE — 3044F HG A1C LEVEL LT 7.0%: CPT | Mod: CPTII,S$GLB,, | Performed by: OBSTETRICS & GYNECOLOGY

## 2024-04-15 PROCEDURE — 1159F MED LIST DOCD IN RCRD: CPT | Mod: CPTII,S$GLB,, | Performed by: OBSTETRICS & GYNECOLOGY

## 2024-04-15 PROCEDURE — 99213 OFFICE O/P EST LOW 20 MIN: CPT | Mod: S$GLB,,, | Performed by: OBSTETRICS & GYNECOLOGY

## 2024-04-15 PROCEDURE — 99999 PR PBB SHADOW E&M-EST. PATIENT-LVL III: CPT | Mod: PBBFAC,,, | Performed by: OBSTETRICS & GYNECOLOGY

## 2024-04-15 NOTE — PROGRESS NOTES
"Maternal Fetal Medicine follow up consult    SUBJECTIVE:     Maxine Sanchez is a 29 y.o.  female with IUP at 25w5d who is seen in follow up consultation by MFM.  Pregnancy complications include:   Problem   Low-Lying Placenta   Pregnancy Complicated By Pre-Existing Type 2 Diabetes, Second Trimester       Previous notes reviewed.   No changes to medical, surgical, family, social, or obstetric history.    Interval history since last MFM visit: +FM. No complaints.    Medications reviewed.    Care team members:  Dr. Momin - Primary OB     OBJECTIVE:   /75 (BP Location: Left arm, Patient Position: Sitting, BP Method: Medium (Automatic))   Ht 5' 5" (1.651 m)   Wt 118 kg (260 lb 2.3 oz)   BMI 43.29 kg/m²     Physical Exam    Ultrasound performed. See viewpoint for full ultrasound report.    Significant labs/imaging:  BS log reviewed.    ASSESSMENT/PLAN:     29 y.o.  female with IUP at 25w5d    Pregnancy complicated by pre-existing type 2 diabetes, second trimester  Type 2 Diabetes in pregnancy  See prior notes for full counseling.   Fetal echo with possible small VSD    Current regimen:   - NPH 36 units at bedtime   - Aspart 14 units TID with meals  - Metformin 500 mg with dinner.    Lab Results   Component Value Date    HGBA1C 5.0 2024     Recommendations  - Increase NPH to 38 units at bedtime, remainder of medication doses to remain the same  - Fasting and 2-hour postprandial glucose monitoring.   - Low dose aspirin 81 mg daily at 12-16 weeks for preeclampsia risk reduction  - Serial growth ultrasounds q 4-6 weeks at 26-28 weeks  - Fetal echo completed - cannot rule out small VSD, elective evaluation of the infant preferably after 24 hours to allow for normal  transitional physiology to proceed and before discharge from the nursery (telemedicine echo if delivery at [facility] as anticipated)   - Twice weekly BPP/ NST + AFV starting at 32 weeks. (Should be ordered and arranged by the " patient's primary OB provider).    Delivery timin 0/7 to 38 and 6/7 weeks if under good control without comorbidities  37 0/7 to 37 and 6/7 weeks if longstanding diabetes or poorly controlled, polyhydramnios, EFW>90th percentile, or BMI >= 40  36 0/7 to 37 and 6/7 weeks if vascular complications, prior stillbirth or other complicating conditions.  Recommend consideration of earlier delivery if IUGR, HTN, or other complications  Recommend offering  for delivery is EFW is 4500g or more near the time of delivery    Low-lying placenta  Re-evaluate at next US. Patient counseled. Aware of need for TV scan to assess for resolution.    F/u in 4-5 weeks for MFM visit and US. Send log in 2 weeks for review.

## 2024-04-15 NOTE — ASSESSMENT & PLAN NOTE
Type 2 Diabetes in pregnancy  See prior notes for full counseling.   Fetal echo with possible small VSD    Current regimen:   - NPH 36 units at bedtime   - Aspart 14 units TID with meals  - Metformin 500 mg with dinner.    Lab Results   Component Value Date    HGBA1C 5.0 2024     Recommendations  - Increase NPH to 38 units at bedtime, remainder of medication doses to remain the same  - Fasting and 2-hour postprandial glucose monitoring.   - Low dose aspirin 81 mg daily at 12-16 weeks for preeclampsia risk reduction  - Serial growth ultrasounds q 4-6 weeks at 26-28 weeks  - Fetal echo completed - cannot rule out small VSD, elective evaluation of the infant preferably after 24 hours to allow for normal  transitional physiology to proceed and before discharge from the nursery (telemedicine echo if delivery at [facility] as anticipated)   - Twice weekly BPP/ NST + AFV starting at 32 weeks. (Should be ordered and arranged by the patient's primary OB provider).    Delivery timin 0/7 to 38 and 6/7 weeks if under good control without comorbidities  37 0/7 to 37 and 6/7 weeks if longstanding diabetes or poorly controlled, polyhydramnios, EFW>90th percentile, or BMI >= 40  36 0/7 to 37 and 6/7 weeks if vascular complications, prior stillbirth or other complicating conditions.  Recommend consideration of earlier delivery if IUGR, HTN, or other complications  Recommend offering  for delivery is EFW is 4500g or more near the time of delivery

## 2024-04-29 ENCOUNTER — PATIENT MESSAGE (OUTPATIENT)
Dept: MATERNAL FETAL MEDICINE | Facility: CLINIC | Age: 29
End: 2024-04-29
Payer: COMMERCIAL

## 2024-04-29 NOTE — TELEPHONE ENCOUNTER
BS log reviewed. Overall, very good control. Lunch are more frequently <100. Fastings high 90s-low 100s.  Current regimen:  - metformin 500 qhs  - NPH 38u qhs  - Aspart 14/14/14      New regimen:   - metformin 500 qhs  - NPH 42u qhs  - Aspart 14/12/14    ALEXA Hicks MD  Maternal Fetal Medicine Fellow   PGY-5

## 2024-04-30 ENCOUNTER — OFFICE VISIT (OUTPATIENT)
Dept: URGENT CARE | Facility: CLINIC | Age: 29
End: 2024-04-30
Payer: COMMERCIAL

## 2024-04-30 VITALS
DIASTOLIC BLOOD PRESSURE: 67 MMHG | BODY MASS INDEX: 43.82 KG/M2 | WEIGHT: 263 LBS | HEART RATE: 91 BPM | RESPIRATION RATE: 18 BRPM | SYSTOLIC BLOOD PRESSURE: 109 MMHG | HEIGHT: 65 IN | TEMPERATURE: 99 F | OXYGEN SATURATION: 98 %

## 2024-04-30 DIAGNOSIS — U07.1 COVID-19: Primary | ICD-10-CM

## 2024-04-30 DIAGNOSIS — J02.9 SORE THROAT: ICD-10-CM

## 2024-04-30 DIAGNOSIS — U07.1 COVID-19 VIRUS DETECTED: ICD-10-CM

## 2024-04-30 LAB
CTP QC/QA: YES
FLUAV AG NPH QL: NEGATIVE
FLUBV AG NPH QL: NEGATIVE
S PYO RRNA THROAT QL PROBE: NEGATIVE
SARS-COV-2 AG RESP QL IA.RAPID: POSITIVE

## 2024-04-30 PROCEDURE — 87811 SARS-COV-2 COVID19 W/OPTIC: CPT | Mod: QW,S$GLB,, | Performed by: NURSE PRACTITIONER

## 2024-04-30 PROCEDURE — 87880 STREP A ASSAY W/OPTIC: CPT | Mod: QW,,, | Performed by: NURSE PRACTITIONER

## 2024-04-30 PROCEDURE — 87804 INFLUENZA ASSAY W/OPTIC: CPT | Mod: 59,QW,, | Performed by: NURSE PRACTITIONER

## 2024-04-30 PROCEDURE — 99214 OFFICE O/P EST MOD 30 MIN: CPT | Mod: S$GLB,,, | Performed by: NURSE PRACTITIONER

## 2024-04-30 NOTE — LETTER
"Maxine"Jaylyn Sanchez was seen and treated in our emergency department on 4/30/2024.     COVID-19 is present in our communities across the state. In this situation, your employee meets the following criteria:    Maxine Sanchez has met the criteria for COVID-19 testing and has a POSITIVE result. She can return to work once they are asymptomatic for 24 hours without the use of fever reducing medications AND at least five days from the start of symptoms (or from the first positive result if they have no symptoms). .  "

## 2024-04-30 NOTE — PROGRESS NOTES
"Subjective:      Patient ID: Maxine Sanchez is a 29 y.o. female.    Vitals:  height is 5' 5" (1.651 m) and weight is 119.3 kg (263 lb). Her temperature is 98.6 °F (37 °C). Her blood pressure is 109/67 and her pulse is 91. Her respiration is 18 and oxygen saturation is 98%.     Chief Complaint: Sore Throat    Maxine Sanchez is a 29 year old female presenting to the clinic with a 2-3 day history of sore throat, cough, and headache. Coworker tested positive for COVID today. She is 28 weeks gestation and has had no abdominal pain, vaginal bleeding, or vomiting.     Sore Throat   This is a new problem. The current episode started in the past 7 days. The problem has been gradually worsening. Associated symptoms include coughing, headaches and trouble swallowing. Pertinent negatives include no abdominal pain. She has tried acetaminophen for the symptoms. The treatment provided mild relief.       Constitution: Negative.   HENT:  Positive for sore throat and trouble swallowing.    Neck: neck negative.   Cardiovascular: Negative.    Respiratory:  Positive for cough.    Gastrointestinal:  Negative for abdominal pain.   Genitourinary: Negative.  Negative for vaginal bleeding.   Musculoskeletal: Negative.    Skin: Negative.    Neurological:  Positive for headaches.      Objective:     Physical Exam   Constitutional: She is oriented to person, place, and time. She appears well-developed. She is cooperative.  Non-toxic appearance. She does not appear ill. No distress.   HENT:   Head: Normocephalic and atraumatic.   Ears:   Right Ear: Hearing and external ear normal.   Left Ear: Hearing and external ear normal.   Nose: Nose normal. No mucosal edema, rhinorrhea or nasal deformity. No epistaxis. Right sinus exhibits no maxillary sinus tenderness and no frontal sinus tenderness. Left sinus exhibits no maxillary sinus tenderness and no frontal sinus tenderness.   Mouth/Throat: Uvula is midline, oropharynx is clear and moist and mucous " membranes are normal. Mucous membranes are moist. No trismus in the jaw. Normal dentition. No uvula swelling. No oropharyngeal exudate, posterior oropharyngeal edema or posterior oropharyngeal erythema.   Eyes: Conjunctivae and lids are normal. No scleral icterus.   Neck: Trachea normal and phonation normal. Neck supple. No edema present. No erythema present. No neck rigidity present.   Cardiovascular: Normal rate, regular rhythm, normal heart sounds and normal pulses.   Pulmonary/Chest: Effort normal and breath sounds normal. No respiratory distress. She has no decreased breath sounds. She has no rhonchi.   Abdominal: Normal appearance.   Musculoskeletal: Normal range of motion.         General: No deformity. Normal range of motion.   Neurological: She is alert and oriented to person, place, and time. She exhibits normal muscle tone. Coordination normal.   Skin: Skin is warm, dry, intact, not diaphoretic and not pale.   Psychiatric: Her speech is normal and behavior is normal. Judgment and thought content normal.   Nursing note and vitals reviewed.      Assessment:     1. COVID-19    2. Sore throat        Plan:   Patient is positive for COVID 19. She appears well hydrated and nontoxic. Instructed to notify her OB teams regarding her positive status. Discussed symptomatic treatment and current covid guidelines for quarantine. Strict ED precautions for any worsening symptoms.     COVID-19    Sore throat  -     SARS Coronavirus 2 Antigen, POCT Manual Read  -     POCT Influenza A/B Rapid Antigen  -     POCT rapid strep A

## 2024-05-14 DIAGNOSIS — O24.111 TYPE 2 DIABETES MELLITUS AFFECTING PREGNANCY IN FIRST TRIMESTER, ANTEPARTUM: Primary | ICD-10-CM

## 2024-05-14 RX ORDER — INSULIN HUMAN 100 [IU]/ML
42 INJECTION, SUSPENSION SUBCUTANEOUS NIGHTLY
Qty: 20 ML | Refills: 12 | Status: SHIPPED | OUTPATIENT
Start: 2024-05-14

## 2024-05-20 ENCOUNTER — OFFICE VISIT (OUTPATIENT)
Dept: MATERNAL FETAL MEDICINE | Facility: CLINIC | Age: 29
End: 2024-05-20
Payer: COMMERCIAL

## 2024-05-20 ENCOUNTER — PROCEDURE VISIT (OUTPATIENT)
Dept: MATERNAL FETAL MEDICINE | Facility: CLINIC | Age: 29
End: 2024-05-20
Payer: COMMERCIAL

## 2024-05-20 VITALS
BODY MASS INDEX: 44.12 KG/M2 | SYSTOLIC BLOOD PRESSURE: 115 MMHG | DIASTOLIC BLOOD PRESSURE: 70 MMHG | WEIGHT: 265.13 LBS

## 2024-05-20 DIAGNOSIS — O44.40 LOW-LYING PLACENTA: ICD-10-CM

## 2024-05-20 DIAGNOSIS — Z36.89 ENCOUNTER FOR ULTRASOUND TO ASSESS FETAL GROWTH: Primary | ICD-10-CM

## 2024-05-20 DIAGNOSIS — O24.111 TYPE 2 DIABETES MELLITUS AFFECTING PREGNANCY IN FIRST TRIMESTER, ANTEPARTUM: ICD-10-CM

## 2024-05-20 DIAGNOSIS — O24.112 PREGNANCY COMPLICATED BY PRE-EXISTING TYPE 2 DIABETES, SECOND TRIMESTER: Primary | ICD-10-CM

## 2024-05-20 PROCEDURE — 3074F SYST BP LT 130 MM HG: CPT | Mod: CPTII,S$GLB,, | Performed by: OBSTETRICS & GYNECOLOGY

## 2024-05-20 PROCEDURE — 99999 PR PBB SHADOW E&M-EST. PATIENT-LVL III: CPT | Mod: PBBFAC,,, | Performed by: OBSTETRICS & GYNECOLOGY

## 2024-05-20 PROCEDURE — 3044F HG A1C LEVEL LT 7.0%: CPT | Mod: CPTII,S$GLB,, | Performed by: OBSTETRICS & GYNECOLOGY

## 2024-05-20 PROCEDURE — 3078F DIAST BP <80 MM HG: CPT | Mod: CPTII,S$GLB,, | Performed by: OBSTETRICS & GYNECOLOGY

## 2024-05-20 PROCEDURE — 3008F BODY MASS INDEX DOCD: CPT | Mod: CPTII,S$GLB,, | Performed by: OBSTETRICS & GYNECOLOGY

## 2024-05-20 PROCEDURE — 76816 OB US FOLLOW-UP PER FETUS: CPT | Mod: S$GLB,,, | Performed by: OBSTETRICS & GYNECOLOGY

## 2024-05-20 PROCEDURE — 76817 TRANSVAGINAL US OBSTETRIC: CPT | Mod: S$GLB,,, | Performed by: OBSTETRICS & GYNECOLOGY

## 2024-05-20 PROCEDURE — 1159F MED LIST DOCD IN RCRD: CPT | Mod: CPTII,S$GLB,, | Performed by: OBSTETRICS & GYNECOLOGY

## 2024-05-20 PROCEDURE — 99213 OFFICE O/P EST LOW 20 MIN: CPT | Mod: S$GLB,,, | Performed by: OBSTETRICS & GYNECOLOGY

## 2024-05-20 NOTE — PROGRESS NOTES
Maternal Fetal Medicine follow up consult    SUBJECTIVE:     Maxine Sanchez is a 29 y.o.  female with IUP at 30w5d who is seen in follow up consultation by MFM.  Pregnancy complications include:   Problem   Low-Lying Placenta   Pregnancy Complicated By Pre-Existing Type 2 Diabetes, Second Trimester       Previous notes reviewed.   No changes to medical, surgical, family, social, or obstetric history.    Interval history since last MFM visit: Doing well. + FM.    Medications reviewed.    Care team members:  Dr. Momin - Primary OB     OBJECTIVE:   /70 (BP Location: Right arm, Patient Position: Sitting, BP Method: Large (Automatic))   Wt 120.2 kg (265 lb 1.7 oz)   BMI 44.12 kg/m²     Physical Exam    Ultrasound performed. See viewpoint for full ultrasound report.    Significant labs/imaging:      ASSESSMENT/PLAN:     29 y.o.  female with IUP at 30w5d    Pregnancy complicated by pre-existing type 2 diabetes, second trimester  See prior notes.  BS log reviewed.  Excellent control.  A1C 3/25: 5.0  Mild elevated FBS  Increase NPH to 44 QHS  Metformin 500 mg with dinner  Aspart 14  Start twice prenatal testing with Dr. Momin at 32 weeks  Next f/u growth and BS check with MFM in 4 weeks    - Fetal echo completed - cannot rule out small VSD, elective evaluation of the infant preferably after 24 hours to allow for normal  transitional physiology to proceed and before discharge from the nursery (telemedicine echo if delivery at [facility] as anticipated)      Delivery timin 0/7 to 38 and 6/7 weeks if under good control without comorbidities  37 0/7 to 37 and 6/7 weeks if longstanding diabetes or poorly controlled, polyhydramnios, EFW>90th percentile, or BMI >= 40 (current)  36 0/7 to 37 and 6/7 weeks if vascular complications, prior stillbirth or other complicating conditions.  Recommend consideration of earlier delivery if IUGR, HTN, or other complications  Recommend offering   for delivery is EFW is 4500g or more near the time of delivery    Low-lying placenta  Resolved on TA and TV ultrasound.    F/u in 4 weeks for MFM visit  F/u in 4 weeks for US

## 2024-05-20 NOTE — ASSESSMENT & PLAN NOTE
See prior notes.  BS log reviewed.  Excellent control.  A1C 3/25: 5.0  Mild elevated FBS  Increase NPH to 44 QHS  Metformin 500 mg with dinner  Aspart 14  Start twice prenatal testing with Dr. Momin at 32 weeks  Next f/u growth and BS check with MFM in 4 weeks    - Fetal echo completed - cannot rule out small VSD, elective evaluation of the infant preferably after 24 hours to allow for normal  transitional physiology to proceed and before discharge from the nursery (telemedicine echo if delivery at [facility] as anticipated)      Delivery timin 0/7 to 38 and 6/7 weeks if under good control without comorbidities  37 0/7 to 37 and 6/7 weeks if longstanding diabetes or poorly controlled, polyhydramnios, EFW>90th percentile, or BMI >= 40 (current)  36 0/7 to 37 and 6/7 weeks if vascular complications, prior stillbirth or other complicating conditions.  Recommend consideration of earlier delivery if IUGR, HTN, or other complications  Recommend offering  for delivery is EFW is 4500g or more near the time of delivery

## 2024-05-28 DIAGNOSIS — O24.419 GDM, CLASS A2: Primary | ICD-10-CM

## 2024-06-03 ENCOUNTER — HOSPITAL ENCOUNTER (OUTPATIENT)
Facility: HOSPITAL | Age: 29
Discharge: HOME | End: 2024-06-03
Attending: SPECIALIST | Admitting: SPECIALIST
Payer: COMMERCIAL

## 2024-06-03 VITALS — HEART RATE: 80 BPM | DIASTOLIC BLOOD PRESSURE: 65 MMHG | SYSTOLIC BLOOD PRESSURE: 128 MMHG | RESPIRATION RATE: 18 BRPM

## 2024-06-03 DIAGNOSIS — O24.419 GDM, CLASS A2: ICD-10-CM

## 2024-06-03 DIAGNOSIS — O24.419 GDM, CLASS A2: Primary | ICD-10-CM

## 2024-06-03 PROCEDURE — 59025 FETAL NON-STRESS TEST: CPT

## 2024-06-03 NOTE — PROGRESS NOTES
06/03/24 1055   Vital Signs   /65   MAP (mmHg) 88   Pulse 80   Resp 18   Non Stress Test - General   Indications/Pt Reported Complaint GDM   Test Duration (min) 60 min   Number of Fetuses 1   Acoustic Stimulator No   Contractions Not present   Nonstress Test Baby A   Variability Moderate   Decels None   Accels Present   Baseline    Interpretation Baby A   Nonstress Test Interpretation Reactive   Overall Impression Reassuring   Comments Dr. Momin reviewed pt efm STRIP. Orders recvd to dc pt home.

## 2024-06-03 NOTE — DISCHARGE INSTRUCTIONS
Keep your scheduled appointment with your provider.    Call your Doctor if you have any of the following:  Temperature above 100 degrees  Nausea, vomiting and/or diarrhea  Severe headache, dizziness, or blurred vision  Notable increase in swelling of hands or feet  Notable swelling of face and lips  Difficulty, pain or burning with urination  Foul smelling vaginal discharge  Decreased fetal movement    Come to the hospital if you have any of the following symptoms:  Your water breaks  More than 4-6 contractions in 1 hour for 2 or more hours  Vaginal bleeding like a period    After 28 weeks, you should feel 10 distinct fetal movements within a 2 hour period.    It is recommended that you drink 1/2 a gallon of water each day.  Tea, Soda and Juice are  in addition to this.    Labor and Delivery Phone number: [phone]    If you need to be seen on Labor and delivery between the hours of 6pm and 7am, please enter through the Emergency room entrance.

## 2024-06-06 ENCOUNTER — HOSPITAL ENCOUNTER (OUTPATIENT)
Facility: HOSPITAL | Age: 29
Discharge: HOME | End: 2024-06-06
Attending: SPECIALIST | Admitting: SPECIALIST
Payer: COMMERCIAL

## 2024-06-06 VITALS — RESPIRATION RATE: 19 BRPM | SYSTOLIC BLOOD PRESSURE: 117 MMHG | HEART RATE: 81 BPM | DIASTOLIC BLOOD PRESSURE: 62 MMHG

## 2024-06-06 DIAGNOSIS — O24.419 GDM, CLASS A2: ICD-10-CM

## 2024-06-06 PROCEDURE — 59025 FETAL NON-STRESS TEST: CPT

## 2024-06-06 NOTE — DISCHARGE INSTRUCTIONS
Keep your scheduled appointment with your provider.    Call your Doctor if you have any of the following:  Temperature above 100 degrees  Nausea, vomiting and/or diarrhea  Severe headache, dizziness, or blurred vision  Notable increase in swelling of hands or feet  Notable swelling of face and lips  Difficulty, pain or burning with urination  Foul smelling vaginal discharge  Decreased fetal movement    Come to the hospital if you have any of the following symptoms:  Your water breaks  More than 4-6 contractions in 1 hour for 2 or more hours  Vaginal bleeding like a period    After 28 weeks, you should feel 10 distinct fetal movements within a 2 hour period.    It is recommended that you drink 1/2 a gallon of water each day.  Tea, Soda and Juice are  in addition to this.    Labor and Delivery Phone number: [phone]    If you need to be seen on Labor and delivery between the hours of 6pm and 7am, please enter through the Emergency room entrance.

## 2024-06-07 ENCOUNTER — PATIENT MESSAGE (OUTPATIENT)
Dept: MATERNAL FETAL MEDICINE | Facility: CLINIC | Age: 29
End: 2024-06-07
Payer: COMMERCIAL

## 2024-06-10 ENCOUNTER — HOSPITAL ENCOUNTER (OUTPATIENT)
Facility: HOSPITAL | Age: 29
Discharge: HOME | End: 2024-06-10
Attending: SPECIALIST | Admitting: SPECIALIST
Payer: COMMERCIAL

## 2024-06-10 VITALS — SYSTOLIC BLOOD PRESSURE: 121 MMHG | DIASTOLIC BLOOD PRESSURE: 70 MMHG | HEART RATE: 84 BPM | RESPIRATION RATE: 17 BRPM

## 2024-06-10 DIAGNOSIS — O24.419 GDM, CLASS A2: ICD-10-CM

## 2024-06-10 PROCEDURE — 59025 FETAL NON-STRESS TEST: CPT

## 2024-06-10 NOTE — DISCHARGE INSTRUCTIONS
Keep your scheduled appointment with your provider.    Call your Doctor if you have any of the following:  Temperature above 100 degrees  Nausea, vomiting and/or diarrhea  Severe headache, dizziness, or blurred vision  Notable increase in swelling of hands or feet  Notable swelling of face and lips  Difficulty, pain or burning with urination  Foul smelling vaginal discharge  Decreased fetal movement    Come to the hospital if you have any of the following symptoms:  Your water breaks  More than 4-6 contractions in 1 hour for 2 or more hours  Vaginal bleeding like a period    After 28 weeks, you should feel 10 distinct fetal movements within a 2 hour period.    It is recommended that you drink 1/2 a gallon of water each day.  Tea, Soda and Juice are  in addition to this.    Labor and Delivery Phone number: [phone]    If you need to be seen on Labor and delivery between the hours of 6pm and 7am, please enter through the Emergency room entrance.

## 2024-06-13 ENCOUNTER — HOSPITAL ENCOUNTER (OUTPATIENT)
Facility: HOSPITAL | Age: 29
Discharge: HOME | End: 2024-06-13
Attending: SPECIALIST | Admitting: SPECIALIST
Payer: COMMERCIAL

## 2024-06-13 VITALS — HEART RATE: 96 BPM | DIASTOLIC BLOOD PRESSURE: 76 MMHG | SYSTOLIC BLOOD PRESSURE: 131 MMHG | RESPIRATION RATE: 17 BRPM

## 2024-06-13 DIAGNOSIS — O24.419 GDM, CLASS A2: ICD-10-CM

## 2024-06-13 LAB — PRENATAL STREP B CULTURE: NEGATIVE

## 2024-06-13 PROCEDURE — 59025 FETAL NON-STRESS TEST: CPT

## 2024-06-13 NOTE — DISCHARGE INSTRUCTIONS
Keep your scheduled appointment with your provider.    Call your Doctor if you have any of the following:  Temperature above 100 degrees  Nausea, vomiting and/or diarrhea  Severe headache, dizziness, or blurred vision  Notable increase in swelling of hands or feet  Notable swelling of face and lips  Difficulty, pain or burning with urination  Foul smelling vaginal discharge  Decreased fetal movement    Come to the hospital if you have any of the following symptoms:  Your water breaks  More than 4-6 contractions in 1 hour for 2 or more hours  Vaginal bleeding like a period    After 28 weeks, you should feel 10 distinct fetal movements within a 2 hour period.    It is recommended that you drink 1/2 a gallon of water each day.  Tea, Soda and Juice are  in addition to this.    Labor and Delivery Phone number: [phone]    If you need to be seen on Labor and delivery between the hours of 6pm and 7am, please enter through the Emergency room entrance.

## 2024-06-17 ENCOUNTER — OFFICE VISIT (OUTPATIENT)
Dept: MATERNAL FETAL MEDICINE | Facility: CLINIC | Age: 29
End: 2024-06-17
Payer: COMMERCIAL

## 2024-06-17 ENCOUNTER — PROCEDURE VISIT (OUTPATIENT)
Dept: MATERNAL FETAL MEDICINE | Facility: CLINIC | Age: 29
End: 2024-06-17
Payer: COMMERCIAL

## 2024-06-17 ENCOUNTER — HOSPITAL ENCOUNTER (OUTPATIENT)
Facility: HOSPITAL | Age: 29
Discharge: HOME | End: 2024-06-17
Attending: SPECIALIST | Admitting: SPECIALIST
Payer: COMMERCIAL

## 2024-06-17 VITALS
DIASTOLIC BLOOD PRESSURE: 84 MMHG | BODY MASS INDEX: 44.44 KG/M2 | SYSTOLIC BLOOD PRESSURE: 138 MMHG | HEIGHT: 65 IN | WEIGHT: 266.75 LBS

## 2024-06-17 VITALS — SYSTOLIC BLOOD PRESSURE: 132 MMHG | RESPIRATION RATE: 17 BRPM | HEART RATE: 86 BPM | DIASTOLIC BLOOD PRESSURE: 79 MMHG

## 2024-06-17 DIAGNOSIS — O24.112 PREGNANCY COMPLICATED BY PRE-EXISTING TYPE 2 DIABETES, SECOND TRIMESTER: Primary | ICD-10-CM

## 2024-06-17 DIAGNOSIS — Z36.89 ENCOUNTER FOR ULTRASOUND TO ASSESS FETAL GROWTH: ICD-10-CM

## 2024-06-17 DIAGNOSIS — O99.212 OBESITY AFFECTING PREGNANCY IN SECOND TRIMESTER, UNSPECIFIED OBESITY TYPE: ICD-10-CM

## 2024-06-17 DIAGNOSIS — O24.419 GDM (GESTATIONAL DIABETES MELLITUS): ICD-10-CM

## 2024-06-17 DIAGNOSIS — Z36.83 ENCOUNTER FOR SCREENING FOR CONGENITAL CARDIAC ABNORMALITIES IN FETUS: ICD-10-CM

## 2024-06-17 DIAGNOSIS — O24.111 TYPE 2 DIABETES MELLITUS AFFECTING PREGNANCY IN FIRST TRIMESTER, ANTEPARTUM: ICD-10-CM

## 2024-06-17 PROBLEM — O44.40 LOW-LYING PLACENTA: Status: RESOLVED | Noted: 2024-04-15 | Resolved: 2024-06-17

## 2024-06-17 PROCEDURE — 3075F SYST BP GE 130 - 139MM HG: CPT | Mod: CPTII,S$GLB,, | Performed by: OBSTETRICS & GYNECOLOGY

## 2024-06-17 PROCEDURE — 99214 OFFICE O/P EST MOD 30 MIN: CPT | Mod: S$GLB,,, | Performed by: OBSTETRICS & GYNECOLOGY

## 2024-06-17 PROCEDURE — 3079F DIAST BP 80-89 MM HG: CPT | Mod: CPTII,S$GLB,, | Performed by: OBSTETRICS & GYNECOLOGY

## 2024-06-17 PROCEDURE — 76816 OB US FOLLOW-UP PER FETUS: CPT | Mod: S$GLB,,, | Performed by: STUDENT IN AN ORGANIZED HEALTH CARE EDUCATION/TRAINING PROGRAM

## 2024-06-17 PROCEDURE — 3044F HG A1C LEVEL LT 7.0%: CPT | Mod: CPTII,S$GLB,, | Performed by: OBSTETRICS & GYNECOLOGY

## 2024-06-17 PROCEDURE — 99999 PR PBB SHADOW E&M-EST. PATIENT-LVL II: CPT | Mod: PBBFAC,,, | Performed by: OBSTETRICS & GYNECOLOGY

## 2024-06-17 PROCEDURE — 59025 FETAL NON-STRESS TEST: CPT

## 2024-06-17 PROCEDURE — 3008F BODY MASS INDEX DOCD: CPT | Mod: CPTII,S$GLB,, | Performed by: OBSTETRICS & GYNECOLOGY

## 2024-06-17 PROCEDURE — 76816 OB US FOLLOW-UP PER FETUS: CPT | Mod: S$GLB,,, | Performed by: OBSTETRICS & GYNECOLOGY

## 2024-06-17 PROCEDURE — 76819 FETAL BIOPHYS PROFIL W/O NST: CPT | Mod: S$GLB,,, | Performed by: STUDENT IN AN ORGANIZED HEALTH CARE EDUCATION/TRAINING PROGRAM

## 2024-06-17 PROCEDURE — 76819 FETAL BIOPHYS PROFIL W/O NST: CPT | Mod: S$GLB,,, | Performed by: OBSTETRICS & GYNECOLOGY

## 2024-06-17 NOTE — Clinical Note
Here is the note from our most recent visit. Please let me know if you have any questions. Henrietta

## 2024-06-17 NOTE — PROGRESS NOTES
"Maternal Fetal Medicine follow up consult    SUBJECTIVE:     Maxine Sanchez is a 29 y.o.  female with IUP at 34w5d who is seen in follow up consultation by MFM.  Pregnancy complications include:   Problem   Encounter for Screening for Congenital Cardiac Abnormalities in Fetus   Obesity Affecting Pregnancy in Second Trimester   Pregnancy Complicated By Pre-Existing Type 2 Diabetes, Second Trimester   Low-Lying Placenta (Resolved)     Previous notes reviewed.   No changes to medical, surgical, family, social, or obstetric history.    Interval history since last MFM visit:   Patient has no complaints today. She is overall feeling well.  Patient denies any contractions/cramping, vaginal bleeding or leakage of fluid.  She reports good fetal movement.    Medications:  Current Outpatient Medications   Medication Instructions    aspirin (ECOTRIN) 81 mg, Oral, Daily    HumuLIN N NPH U-100 Insulin 42 Units, Subcutaneous, Nightly    insulin syringe-needle U-100 0.5 mL 31 gauge x 5/16" Syrg Use 4 (four) times daily.    loratadine (CLARITIN) 10 mg, Oral, Daily    metFORMIN (GLUCOPHAGE) 500 mg, Oral, With dinner    NovoLOG U-100 Insulin aspart 14 Units, Subcutaneous, 3 times daily before meals    prenatal vit no.124/iron/folic (PRENATAL VITAMIN ORAL) Oral    valACYclovir (VALTREX) 4,000 mg, Once     Care team members:  Liliane - Primary OB     OBJECTIVE:   /84 (BP Location: Left arm, Patient Position: Sitting, BP Method: Medium (Automatic))   Ht 5' 5" (1.651 m)   Wt 121 kg (266 lb 12.1 oz)   BMI 44.39 kg/m²     Physical Exam:  Deferred    Ultrasound performed. See viewpoint for full ultrasound report.  Bates live IUP  Fetal size is appropriate for gestational age, with the EFW (2407 g) plotting at the 24% and the AC plotting at the 47%.   A limited repeat fetal anatomic survey appears normal.   The BPP score is reassuring at 8/8.  The MVP is normal.  breech presentation.     Significant labs/imaging:  Lab Results "   Component Value Date    HGBA1C 5.0 2024         ASSESSMENT/PLAN:     29 y.o.  female with IUP at 34w5d    Pregnancy complicated by pre-existing type 2 diabetes, second trimester  Type 2 Diabetes in pregnancy  See prior notes for full counseling.   Fetal echo with possible small VSD    Current regimen:   - NPH 44 units at bedtime   - Aspart 14/12/14 units with meals  - Metformin 500 mg with dinner.    Lab Results   Component Value Date    HGBA1C 5.0 2024     BS log reviewed, per above. Occasional elevations noted postprandially, although mostly diet related. Fastings remain overall elevated. Will adjust nighttime insulin accordingly.    Recommendations:  Given sugars  Increase NPH to 50 units at bedtime  Remainder of medication doses to remain the same  Follow up BS check in 1-2 weeks  This may be virtual, as available.  Fasting and 2-hour postprandial glucose monitoring.   Continue Low dose aspirin 81 mg daily for preeclampsia risk reduction  Hypoglycemia precautions reviewed.  Fetal echo completed - cannot rule out small VSD, elective evaluation of the infant preferably after 24 hours to allow for normal  transitional physiology to proceed and before discharge from the nursery (telemedicine echo if delivery at [facility] as anticipated)   Twice weekly BPP/ NST + AFV starting at 32 weeks. (Should be ordered and arranged by the patient's primary OB provider).    Delivery timin 0/7 to 37 and 6/7 weeks if longstanding diabetes or poorly controlled, polyhydramnios, EFW>90th percentile, or BMI >= 40 (CURRENTLY)  36 0/7 to 37 and 6/7 weeks if vascular complications, prior stillbirth or other complicating conditions.  Recommend consideration of earlier delivery if IUGR, HTN, or other complications  Recommend offering  for delivery is EFW is 4500g or more near the time of delivery      Encounter for screening for congenital cardiac abnormalities in fetus  See Fetal  ECHO      Obesity affecting pregnancy in second trimester  Management per primary OB provider      Patient was counseled that prenatal ultrasound studies have limitations. They do not detect all fetal, genetic, placental, and maternal abnormalities. A normal appearing prenatal ultrasound is reassuring. However, it does not guarantee the absence of an abnormality or predict a normal outcome for the fetus or the mother.     Patient's other comorbidites were not addressed in today's visit.  Will defer management to primary OB provider      FOLLOW UP: No further ultrasounds  were scheduled.   SHAW LOPEZ visit in 1-2 weeks for BS check.      The patient was given an opportunity to ask questions about the management of her high risk pregnancy problems. She expressed an understanding of and agreement to the above impression and plan. All questions were answered to her satisfaction.      Noe Dunn MD   Maternal-Fetal Medicine      Electronically Signed by Noe Dunn June 17, 2024

## 2024-06-17 NOTE — DISCHARGE INSTRUCTIONS
Keep your scheduled appointment with your provider.    Call your Doctor if you have any of the following:  Temperature above 100 degrees  Nausea, vomiting and/or diarrhea  Severe headache, dizziness, or blurred vision  Notable increase in swelling of hands or feet  Notable swelling of face and lips  Difficulty, pain or burning with urination  Foul smelling vaginal discharge  Decreased fetal movement    Come to the hospital if you have any of the following symptoms:  Your water breaks  More than 4-6 contractions in 1 hour for 2 or more hours  Vaginal bleeding like a period    After 28 weeks, you should feel 10 distinct fetal movements within a 2 hour period.    It is recommended that you drink 1/2 a gallon of water each day.  Tea, Soda and Juice are  in addition to this.    Labor and Delivery Phone number: [phone]    If you need to be seen on Labor and delivery between the hours of 6pm and 7am, please enter through the Emergency room entrance.

## 2024-06-17 NOTE — ASSESSMENT & PLAN NOTE
Type 2 Diabetes in pregnancy  See prior notes for full counseling.   Fetal echo with possible small VSD    Current regimen:   - NPH 44 units at bedtime   - Aspart 14 units with meals  - Metformin 500 mg with dinner.    Lab Results   Component Value Date    HGBA1C 5.0 2024     BS log reviewed, per above. Occasional elevations noted postprandially, although mostly diet related. Fastings remain overall elevated. Will adjust nighttime insulin accordingly.    Recommendations:  Given sugars  Increase NPH to 50 units at bedtime  Remainder of medication doses to remain the same  Follow up BS check in 1-2 weeks  This may be virtual, as available.  Fasting and 2-hour postprandial glucose monitoring.   Continue Low dose aspirin 81 mg daily for preeclampsia risk reduction  Hypoglycemia precautions reviewed.  Fetal echo completed - cannot rule out small VSD, elective evaluation of the infant preferably after 24 hours to allow for normal  transitional physiology to proceed and before discharge from the nursery (telemedicine echo if delivery at [facility] as anticipated)   Twice weekly BPP/ NST + AFV starting at 32 weeks. (Should be ordered and arranged by the patient's primary OB provider).    Delivery timin 0/7 to 37 and 6/7 weeks if longstanding diabetes or poorly controlled, polyhydramnios, EFW>90th percentile, or BMI >= 40 (CURRENTLY)  36 0/7 to 37 and 6/7 weeks if vascular complications, prior stillbirth or other complicating conditions.  Recommend consideration of earlier delivery if IUGR, HTN, or other complications  Recommend offering  for delivery is EFW is 4500g or more near the time of delivery

## 2024-06-20 ENCOUNTER — HOSPITAL ENCOUNTER (OUTPATIENT)
Facility: HOSPITAL | Age: 29
Discharge: HOME | End: 2024-06-20
Attending: SPECIALIST | Admitting: SPECIALIST
Payer: COMMERCIAL

## 2024-06-20 VITALS — DIASTOLIC BLOOD PRESSURE: 59 MMHG | RESPIRATION RATE: 17 BRPM | HEART RATE: 83 BPM | SYSTOLIC BLOOD PRESSURE: 114 MMHG

## 2024-06-20 DIAGNOSIS — O24.419 GDM (GESTATIONAL DIABETES MELLITUS): ICD-10-CM

## 2024-06-20 PROCEDURE — 59025 FETAL NON-STRESS TEST: CPT

## 2024-06-20 NOTE — DISCHARGE INSTRUCTIONS
Keep your scheduled appointment with your provider.    Call your Doctor if you have any of the following:  Temperature above 100 degrees  Nausea, vomiting and/or diarrhea  Severe headache, dizziness, or blurred vision  Notable increase in swelling of hands or feet  Notable swelling of face and lips  Difficulty, pain or burning with urination  Foul smelling vaginal discharge  Decreased fetal movement    Come to the hospital if you have any of the following symptoms:  Your water breaks  More than 4-6 contractions in 1 hour for 2 or more hours  Vaginal bleeding like a period    After 28 weeks, you should feel 10 distinct fetal movements within a 2 hour period.    It is recommended that you drink 1/2 a gallon of water each day.  Tea, Soda and Juice are  in addition to this.    Labor and Delivery Phone number: [phone]    If you need to be seen on Labor and delivery between the hours of 6pm and 7am, please enter through the Emergency room entrance.

## 2024-06-24 ENCOUNTER — HOSPITAL ENCOUNTER (OUTPATIENT)
Facility: HOSPITAL | Age: 29
Discharge: HOME | End: 2024-06-24
Attending: SPECIALIST | Admitting: SPECIALIST
Payer: COMMERCIAL

## 2024-06-24 VITALS — DIASTOLIC BLOOD PRESSURE: 72 MMHG | RESPIRATION RATE: 18 BRPM | HEART RATE: 83 BPM | SYSTOLIC BLOOD PRESSURE: 123 MMHG

## 2024-06-24 DIAGNOSIS — O24.419 GDM, CLASS A2: ICD-10-CM

## 2024-06-24 PROCEDURE — 59025 FETAL NON-STRESS TEST: CPT

## 2024-06-24 NOTE — DISCHARGE INSTRUCTIONS
Keep your scheduled appointment with your provider.    Call your Doctor if you have any of the following:  Temperature above 100 degrees  Nausea, vomiting and/or diarrhea  Severe headache, dizziness, or blurred vision  Notable increase in swelling of hands or feet  Notable swelling of face and lips  Difficulty, pain or burning with urination  Foul smelling vaginal discharge  Decreased fetal movement    Come to the hospital if you have any of the following symptoms:  Your water breaks  More than 4-6 contractions in 1 hour for 2 or more hours  Vaginal bleeding like a period    After 28 weeks, you should feel 10 distinct fetal movements within a 2 hour period.    It is recommended that you drink 1/2 a gallon of water each day.  Tea, Soda and Juice are  in addition to this.    Labor and Delivery Phone number: [phone]    If you need to be seen on Labor and delivery between the hours of 6pm and 7am, please enter through the Emergency room entrance.

## 2024-06-26 ENCOUNTER — PATIENT MESSAGE (OUTPATIENT)
Dept: MATERNAL FETAL MEDICINE | Facility: CLINIC | Age: 29
End: 2024-06-26
Payer: COMMERCIAL

## 2024-06-27 ENCOUNTER — OFFICE VISIT (OUTPATIENT)
Dept: MATERNAL FETAL MEDICINE | Facility: CLINIC | Age: 29
End: 2024-06-27
Attending: OBSTETRICS & GYNECOLOGY
Payer: COMMERCIAL

## 2024-06-27 ENCOUNTER — HOSPITAL ENCOUNTER (OUTPATIENT)
Facility: HOSPITAL | Age: 29
Discharge: HOME | End: 2024-06-27
Attending: SPECIALIST | Admitting: SPECIALIST
Payer: COMMERCIAL

## 2024-06-27 VITALS — DIASTOLIC BLOOD PRESSURE: 54 MMHG | RESPIRATION RATE: 16 BRPM | SYSTOLIC BLOOD PRESSURE: 102 MMHG | HEART RATE: 82 BPM

## 2024-06-27 DIAGNOSIS — O24.913 DIABETES MELLITUS AFFECTING PREGNANCY, THIRD TRIMESTER: Primary | ICD-10-CM

## 2024-06-27 DIAGNOSIS — O24.419 GDM, CLASS A2: ICD-10-CM

## 2024-06-27 PROBLEM — O99.212 OBESITY AFFECTING PREGNANCY IN SECOND TRIMESTER: Status: RESOLVED | Noted: 2024-03-26 | Resolved: 2024-06-27

## 2024-06-27 PROBLEM — O24.112 PREGNANCY COMPLICATED BY PRE-EXISTING TYPE 2 DIABETES, SECOND TRIMESTER: Status: RESOLVED | Noted: 2024-01-22 | Resolved: 2024-06-27

## 2024-06-27 PROCEDURE — 59025 FETAL NON-STRESS TEST: CPT

## 2024-06-27 NOTE — DISCHARGE INSTRUCTIONS
Keep your scheduled appointment with your provider.    Call your Doctor if you have any of the following:  Temperature above 100 degrees  Nausea, vomiting and/or diarrhea  Severe headache, dizziness, or blurred vision  Notable increase in swelling of hands or feet  Notable swelling of face and lips  Difficulty, pain or burning with urination  Foul smelling vaginal discharge  Decreased fetal movement    Come to the hospital if you have any of the following symptoms:  Your water breaks  More than 4-6 contractions in 1 hour for 2 or more hours  Vaginal bleeding like a period    After 28 weeks, you should feel 10 distinct fetal movements within a 2 hour period.    It is recommended that you drink 1/2 a gallon of water each day.  Tea, Soda and Juice are  in addition to this.    Labor and Delivery Phone number: [phone]    If you need to be seen on Labor and delivery between the hours of 6pm and 7am, please enter through the Emergency room entrance.

## 2024-06-27 NOTE — PROGRESS NOTES
The patient location is: Home  The chief complaint leading to consultation is: Diabetes/pregnancy    Visit type: audiovisual    Face to Face time with patient: 10 min  15 minutes of total time spent on the encounter, which includes face to face time and non-face to face time preparing to see the patient (eg, review of tests), Obtaining and/or reviewing separately obtained history, Documenting clinical information in the electronic or other health record, Independently interpreting results (not separately reported) and communicating results to the patient/family/caregiver, or Care coordination (not separately reported).         Each patient to whom he or she provides medical services by telemedicine is:  (1) informed of the relationship between the physician and patient and the respective role of any other health care provider with respect to management of the patient; and (2) notified that he or she may decline to receive medical services by telemedicine and may withdraw from such care at any time.    Chief complaint: Diabetes complicating pregnancy    Provider requesting consultation: Dr. Momin    29 y.o. at 36w1d EGA.    PMH:  Past Medical History:   Diagnosis Date    Diabetes mellitus, type 2     Psoriasis        PObHx:  OB History    Para Term  AB Living   1             SAB IAB Ectopic Multiple Live Births                  # Outcome Date GA Lbr Vincent/2nd Weight Sex Type Anes PTL Lv   1 Current                PSH:  Past Surgical History:   Procedure Laterality Date    TONSILLECTOMY         Family history:family history is not on file.    Social history: reports that she has never smoked. She has never used smokeless tobacco. She reports that she does not currently use alcohol. She reports that she does not use drugs.    A detailed fetal anatomical ultrasound was completed today.  [facility] details [facility] section of [facility].    Follow up consultation regarding diabetes in pregnancy provided today.   Risks of uncontrolled diabetes in pregnancy reviewed once again.  Blood glucose measurements assessed.    She is currently taking:   NPH 50 units at bedtime   - Aspart 14/12/14 units with meals  - Metformin 500 mg with dinner.         I did adjust her medications today.  I increased her PM NPH to 60 leaving her short acing unchanged.    The patient was given an opportunity to ask questions about management and the diease process.  She expressed an understanding of and agreement to the above impression and plan. All questions were answered to her satisfaction.  She was given contact information to the [facility] clinic to address further concerns.

## 2024-07-01 ENCOUNTER — HOSPITAL ENCOUNTER (OUTPATIENT)
Facility: HOSPITAL | Age: 29
Discharge: HOME | End: 2024-07-01
Attending: SPECIALIST | Admitting: SPECIALIST
Payer: COMMERCIAL

## 2024-07-01 VITALS — DIASTOLIC BLOOD PRESSURE: 75 MMHG | HEART RATE: 73 BPM | SYSTOLIC BLOOD PRESSURE: 124 MMHG

## 2024-07-01 DIAGNOSIS — O24.419 GESTATIONAL DIABETES: ICD-10-CM

## 2024-07-01 PROCEDURE — 59025 FETAL NON-STRESS TEST: CPT

## 2024-07-01 NOTE — DISCHARGE INSTRUCTIONS
Keep your scheduled appointment with your provider.    Call your Doctor if you have any of the following:  Temperature above 100 degrees  Nausea, vomiting and/or diarrhea  Severe headache, dizziness, or blurred vision  Notable increase in swelling of hands or feet  Notable swelling of face and lips  Difficulty, pain or burning with urination  Foul smelling vaginal discharge  Decreased fetal movement    Come to the hospital if you have any of the following symptoms:  Your water breaks  More than 4-6 contractions in 1 hour for 2 or more hours  Vaginal bleeding like a period    After 28 weeks, you should feel 10 distinct fetal movements within a 2 hour period.    It is recommended that you drink 1/2 a gallon of water each day.  Tea, Soda and Juice are  in addition to this.    Labor and Delivery Phone number: [phone]    If you need to be seen on Labor and delivery between the hours of 6pm and 7am, please enter through the Emergency room entrance.

## 2024-07-03 ENCOUNTER — ANESTHESIA (OUTPATIENT)
Dept: OBSTETRICS AND GYNECOLOGY | Facility: HOSPITAL | Age: 29
End: 2024-07-03
Payer: COMMERCIAL

## 2024-07-03 ENCOUNTER — HOSPITAL ENCOUNTER (INPATIENT)
Facility: HOSPITAL | Age: 29
LOS: 4 days | Discharge: HOME | End: 2024-07-07
Attending: SPECIALIST | Admitting: SPECIALIST
Payer: COMMERCIAL

## 2024-07-03 ENCOUNTER — ANESTHESIA EVENT (OUTPATIENT)
Dept: OBSTETRICS AND GYNECOLOGY | Facility: HOSPITAL | Age: 29
End: 2024-07-03
Payer: COMMERCIAL

## 2024-07-03 DIAGNOSIS — Z34.90 PREGNANCY: ICD-10-CM

## 2024-07-03 LAB
ABO + RH BLD: NORMAL
ALBUMIN SERPL BCP-MCNC: 3.1 G/DL (ref 3.5–5.2)
ALP SERPL-CCNC: 116 U/L (ref 55–135)
ALT SERPL W/O P-5'-P-CCNC: 18 U/L (ref 10–44)
AMPHET+METHAMPHET UR QL: NEGATIVE
ANION GAP SERPL CALC-SCNC: 7 MMOL/L (ref 8–16)
AST SERPL-CCNC: 16 U/L (ref 10–40)
BARBITURATES UR QL SCN>200 NG/ML: NEGATIVE
BASOPHILS # BLD AUTO: 0.03 K/UL (ref 0–0.2)
BASOPHILS NFR BLD: 0.2 % (ref 0–1.9)
BENZODIAZ UR QL SCN>200 NG/ML: NEGATIVE
BILIRUB SERPL-MCNC: 0.3 MG/DL (ref 0.1–1)
BLD GP AB SCN CELLS X3 SERPL QL: NORMAL
BUN SERPL-MCNC: 14 MG/DL (ref 6–20)
BUPRENORPHINE UR QL: NEGATIVE
BZE UR QL SCN: NEGATIVE
CALCIUM SERPL-MCNC: 7.8 MG/DL (ref 8.7–10.5)
CANNABINOIDS UR QL SCN: NEGATIVE
CHLORIDE SERPL-SCNC: 110 MMOL/L (ref 95–110)
CO2 SERPL-SCNC: 20 MMOL/L (ref 23–29)
CREAT SERPL-MCNC: 0.5 MG/DL (ref 0.5–1.4)
CREAT UR-MCNC: 70.6 MG/DL (ref 15–325)
DIFFERENTIAL METHOD BLD: ABNORMAL
EOSINOPHIL # BLD AUTO: 0.1 K/UL (ref 0–0.5)
EOSINOPHIL NFR BLD: 0.5 % (ref 0–8)
ERYTHROCYTE [DISTWIDTH] IN BLOOD BY AUTOMATED COUNT: 14.3 % (ref 11.5–14.5)
EST. GFR  (NO RACE VARIABLE): >60 ML/MIN/1.73 M^2
FENTANYL UR QL SCN: NORMAL
GLUCOSE SERPL-MCNC: 73 MG/DL (ref 70–110)
GLUCOSE SERPL-MCNC: 82 MG/DL (ref 70–110)
GLUCOSE SERPL-MCNC: 84 MG/DL (ref 70–110)
GLUCOSE SERPL-MCNC: 89 MG/DL (ref 70–110)
GLUCOSE SERPL-MCNC: 91 MG/DL (ref 70–110)
HCT VFR BLD AUTO: 34.6 % (ref 37–48.5)
HGB BLD-MCNC: 11.2 G/DL (ref 12–16)
IMM GRANULOCYTES # BLD AUTO: 0.09 K/UL (ref 0–0.04)
IMM GRANULOCYTES NFR BLD AUTO: 0.7 % (ref 0–0.5)
LYMPHOCYTES # BLD AUTO: 2.1 K/UL (ref 1–4.8)
LYMPHOCYTES NFR BLD: 16 % (ref 18–48)
MCH RBC QN AUTO: 27.2 PG (ref 27–31)
MCHC RBC AUTO-ENTMCNC: 32.4 G/DL (ref 32–36)
MCV RBC AUTO: 84 FL (ref 82–98)
MONOCYTES # BLD AUTO: 0.6 K/UL (ref 0.3–1)
MONOCYTES NFR BLD: 4.3 % (ref 4–15)
NEUTROPHILS # BLD AUTO: 10.2 K/UL (ref 1.8–7.7)
NEUTROPHILS NFR BLD: 78.3 % (ref 38–73)
NRBC BLD-RTO: 0 /100 WBC
OPIATES UR QL SCN: NEGATIVE
PCP UR QL SCN>25 NG/ML: NEGATIVE
PLATELET # BLD AUTO: 297 K/UL (ref 150–450)
PMV BLD AUTO: 10.1 FL (ref 9.2–12.9)
POTASSIUM SERPL-SCNC: 3.8 MMOL/L (ref 3.5–5.1)
PROT SERPL-MCNC: 5.6 G/DL (ref 6–8.4)
RBC # BLD AUTO: 4.12 M/UL (ref 4–5.4)
SODIUM SERPL-SCNC: 137 MMOL/L (ref 136–145)
TOXICOLOGY INFORMATION: NORMAL
TREPONEMA PALLIDUM IGG+IGM AB [PRESENCE] IN SERUM OR PLASMA BY IMMUNOASSAY: NONREACTIVE
WBC # BLD AUTO: 13.01 K/UL (ref 3.9–12.7)

## 2024-07-03 PROCEDURE — 36004725 HC OB OR TIME LEV III - EA ADD 15 MIN: Performed by: SPECIALIST

## 2024-07-03 PROCEDURE — 86901 BLOOD TYPING SEROLOGIC RH(D): CPT | Performed by: SPECIALIST

## 2024-07-03 PROCEDURE — 63600175 PHARM REV CODE 636 W HCPCS: Mod: JZ,JG | Performed by: ANESTHESIOLOGY

## 2024-07-03 PROCEDURE — 80053 COMPREHEN METABOLIC PANEL: CPT | Performed by: SPECIALIST

## 2024-07-03 PROCEDURE — 71000039 HC RECOVERY, EACH ADD'L HOUR: Performed by: SPECIALIST

## 2024-07-03 PROCEDURE — 27201423 OPTIME MED/SURG SUP & DEVICES STERILE SUPPLY: Performed by: SPECIALIST

## 2024-07-03 PROCEDURE — 80354 DRUG SCREENING FENTANYL: CPT | Performed by: SPECIALIST

## 2024-07-03 PROCEDURE — 80348 DRUG SCREENING BUPRENORPHINE: CPT | Performed by: SPECIALIST

## 2024-07-03 PROCEDURE — 36004724 HC OB OR TIME LEV III - 1ST 15 MIN: Performed by: SPECIALIST

## 2024-07-03 PROCEDURE — 71000033 HC RECOVERY, INTIAL HOUR: Performed by: SPECIALIST

## 2024-07-03 PROCEDURE — 63600175 PHARM REV CODE 636 W HCPCS: Performed by: SPECIALIST

## 2024-07-03 PROCEDURE — 86593 SYPHILIS TEST NON-TREP QUANT: CPT | Performed by: SPECIALIST

## 2024-07-03 PROCEDURE — 85025 COMPLETE CBC W/AUTO DIFF WBC: CPT | Performed by: SPECIALIST

## 2024-07-03 PROCEDURE — 36415 COLL VENOUS BLD VENIPUNCTURE: CPT | Performed by: SPECIALIST

## 2024-07-03 PROCEDURE — 27000670 HC SET COMBINED SPINAL AND EPIDURAL: Performed by: ANESTHESIOLOGY

## 2024-07-03 PROCEDURE — 63600175 PHARM REV CODE 636 W HCPCS: Performed by: NURSE ANESTHETIST, CERTIFIED REGISTERED

## 2024-07-03 PROCEDURE — 80307 DRUG TEST PRSMV CHEM ANLYZR: CPT | Performed by: SPECIALIST

## 2024-07-03 PROCEDURE — 37000008 HC ANESTHESIA 1ST 15 MINUTES: Performed by: SPECIALIST

## 2024-07-03 PROCEDURE — 37000009 HC ANESTHESIA EA ADD 15 MINS: Performed by: SPECIALIST

## 2024-07-03 PROCEDURE — 25000003 PHARM REV CODE 250: Performed by: SPECIALIST

## 2024-07-03 PROCEDURE — 12000002 HC ACUTE/MED SURGE SEMI-PRIVATE ROOM

## 2024-07-03 PROCEDURE — 51702 INSERT TEMP BLADDER CATH: CPT

## 2024-07-03 PROCEDURE — C1751 CATH, INF, PER/CENT/MIDLINE: HCPCS | Performed by: ANESTHESIOLOGY

## 2024-07-03 RX ORDER — AMOXICILLIN 250 MG
1 CAPSULE ORAL 2 TIMES DAILY PRN
Status: DISCONTINUED | OUTPATIENT
Start: 2024-07-03 | End: 2024-07-07 | Stop reason: HOSPADM

## 2024-07-03 RX ORDER — MORPHINE SULFATE 0.5 MG/ML
INJECTION, SOLUTION EPIDURAL; INTRATHECAL; INTRAVENOUS
Status: DISCONTINUED | OUTPATIENT
Start: 2024-07-03 | End: 2024-07-03

## 2024-07-03 RX ORDER — OXYTOCIN-SODIUM CHLORIDE 0.9% IV SOLN 30 UNIT/500ML 30-0.9/5 UT/ML-%
95 SOLUTION INTRAVENOUS ONCE
Status: DISCONTINUED | OUTPATIENT
Start: 2024-07-03 | End: 2024-07-07 | Stop reason: HOSPADM

## 2024-07-03 RX ORDER — METFORMIN HYDROCHLORIDE 500 MG/1
500 TABLET ORAL
Status: DISCONTINUED | OUTPATIENT
Start: 2024-07-03 | End: 2024-07-05

## 2024-07-03 RX ORDER — ONDANSETRON HYDROCHLORIDE 2 MG/ML
INJECTION, SOLUTION INTRAVENOUS
Status: DISCONTINUED | OUTPATIENT
Start: 2024-07-03 | End: 2024-07-03

## 2024-07-03 RX ORDER — MUPIROCIN 20 MG/G
OINTMENT TOPICAL 2 TIMES DAILY
Status: DISCONTINUED | OUTPATIENT
Start: 2024-07-03 | End: 2024-07-07 | Stop reason: HOSPADM

## 2024-07-03 RX ORDER — OXYCODONE AND ACETAMINOPHEN 10; 325 MG/1; MG/1
1 TABLET ORAL EVERY 4 HOURS PRN
Status: DISCONTINUED | OUTPATIENT
Start: 2024-07-03 | End: 2024-07-07 | Stop reason: HOSPADM

## 2024-07-03 RX ORDER — ONDANSETRON HYDROCHLORIDE 2 MG/ML
4 INJECTION, SOLUTION INTRAVENOUS EVERY 6 HOURS PRN
Status: ACTIVE | OUTPATIENT
Start: 2024-07-03 | End: 2024-07-05

## 2024-07-03 RX ORDER — CEFAZOLIN SODIUM 2 G/50ML
2 SOLUTION INTRAVENOUS
Status: DISCONTINUED | OUTPATIENT
Start: 2024-07-03 | End: 2024-07-03

## 2024-07-03 RX ORDER — HYDROMORPHONE HYDROCHLORIDE 1 MG/ML
1.5 INJECTION, SOLUTION INTRAMUSCULAR; INTRAVENOUS; SUBCUTANEOUS EVERY 4 HOURS PRN
Status: DISCONTINUED | OUTPATIENT
Start: 2024-07-03 | End: 2024-07-07 | Stop reason: HOSPADM

## 2024-07-03 RX ORDER — CARBOPROST TROMETHAMINE 250 UG/ML
250 INJECTION, SOLUTION INTRAMUSCULAR
Status: DISCONTINUED | OUTPATIENT
Start: 2024-07-03 | End: 2024-07-07 | Stop reason: HOSPADM

## 2024-07-03 RX ORDER — OXYCODONE AND ACETAMINOPHEN 5; 325 MG/1; MG/1
1 TABLET ORAL EVERY 4 HOURS PRN
Status: DISCONTINUED | OUTPATIENT
Start: 2024-07-03 | End: 2024-07-07 | Stop reason: HOSPADM

## 2024-07-03 RX ORDER — OXYTOCIN-SODIUM CHLORIDE 0.9% IV SOLN 30 UNIT/500ML 30-0.9/5 UT/ML-%
95 SOLUTION INTRAVENOUS ONCE AS NEEDED
Status: DISCONTINUED | OUTPATIENT
Start: 2024-07-03 | End: 2024-07-03

## 2024-07-03 RX ORDER — ACETAMINOPHEN 10 MG/ML
INJECTION, SOLUTION INTRAVENOUS
Status: DISCONTINUED | OUTPATIENT
Start: 2024-07-03 | End: 2024-07-03

## 2024-07-03 RX ORDER — PRENATAL WITH FERROUS FUM AND FOLIC ACID 3080; 920; 120; 400; 22; 1.84; 3; 20; 10; 1; 12; 200; 27; 25; 2 [IU]/1; [IU]/1; MG/1; [IU]/1; MG/1; MG/1; MG/1; MG/1; MG/1; MG/1; UG/1; MG/1; MG/1; MG/1; MG/1
1 TABLET ORAL DAILY
Status: DISCONTINUED | OUTPATIENT
Start: 2024-07-03 | End: 2024-07-07 | Stop reason: HOSPADM

## 2024-07-03 RX ORDER — DIPHENHYDRAMINE HCL 25 MG
25 CAPSULE ORAL EVERY 4 HOURS PRN
Status: DISCONTINUED | OUTPATIENT
Start: 2024-07-03 | End: 2024-07-07 | Stop reason: HOSPADM

## 2024-07-03 RX ORDER — SODIUM CHLORIDE, SODIUM LACTATE, POTASSIUM CHLORIDE, CALCIUM CHLORIDE 600; 310; 30; 20 MG/100ML; MG/100ML; MG/100ML; MG/100ML
INJECTION, SOLUTION INTRAVENOUS CONTINUOUS
Status: DISCONTINUED | OUTPATIENT
Start: 2024-07-03 | End: 2024-07-03

## 2024-07-03 RX ORDER — DOCUSATE SODIUM 100 MG/1
200 CAPSULE, LIQUID FILLED ORAL 2 TIMES DAILY
Status: DISCONTINUED | OUTPATIENT
Start: 2024-07-03 | End: 2024-07-07 | Stop reason: HOSPADM

## 2024-07-03 RX ORDER — MISOPROSTOL 200 UG/1
800 TABLET ORAL ONCE AS NEEDED
Status: DISCONTINUED | OUTPATIENT
Start: 2024-07-03 | End: 2024-07-07 | Stop reason: HOSPADM

## 2024-07-03 RX ORDER — BUPIVACAINE HYDROCHLORIDE 5 MG/ML
24 INJECTION, SOLUTION EPIDURAL; INTRACAUDAL ONCE
Status: DISCONTINUED | OUTPATIENT
Start: 2024-07-03 | End: 2024-07-03

## 2024-07-03 RX ORDER — INSULIN ASPART 100 [IU]/ML
14 INJECTION, SOLUTION INTRAVENOUS; SUBCUTANEOUS
Status: DISCONTINUED | OUTPATIENT
Start: 2024-07-03 | End: 2024-07-03

## 2024-07-03 RX ORDER — CEFAZOLIN SODIUM 1 G/3ML
INJECTION, POWDER, FOR SOLUTION INTRAMUSCULAR; INTRAVENOUS
Status: DISCONTINUED | OUTPATIENT
Start: 2024-07-03 | End: 2024-07-03

## 2024-07-03 RX ORDER — BUPIVACAINE HYDROCHLORIDE 7.5 MG/ML
INJECTION, SOLUTION EPIDURAL; RETROBULBAR
Status: COMPLETED | OUTPATIENT
Start: 2024-07-03 | End: 2024-07-03

## 2024-07-03 RX ORDER — INSULIN ASPART 100 [IU]/ML
7 INJECTION, SOLUTION INTRAVENOUS; SUBCUTANEOUS
Status: DISCONTINUED | OUTPATIENT
Start: 2024-07-03 | End: 2024-07-05

## 2024-07-03 RX ORDER — OXYTOCIN-SODIUM CHLORIDE 0.9% IV SOLN 30 UNIT/500ML 30-0.9/5 UT/ML-%
10 SOLUTION INTRAVENOUS ONCE AS NEEDED
Status: DISCONTINUED | OUTPATIENT
Start: 2024-07-03 | End: 2024-07-07 | Stop reason: HOSPADM

## 2024-07-03 RX ORDER — INSULIN ASPART 100 [IU]/ML
7 INJECTION, SOLUTION INTRAVENOUS; SUBCUTANEOUS
Status: DISCONTINUED | OUTPATIENT
Start: 2024-07-03 | End: 2024-07-03

## 2024-07-03 RX ORDER — BISACODYL 10 MG/1
10 SUPPOSITORY RECTAL ONCE AS NEEDED
Status: DISCONTINUED | OUTPATIENT
Start: 2024-07-03 | End: 2024-07-07 | Stop reason: HOSPADM

## 2024-07-03 RX ORDER — METHYLERGONOVINE MALEATE 0.2 MG/ML
200 INJECTION INTRAVENOUS ONCE AS NEEDED
Status: DISCONTINUED | OUTPATIENT
Start: 2024-07-03 | End: 2024-07-07 | Stop reason: HOSPADM

## 2024-07-03 RX ORDER — TRANEXAMIC ACID 10 MG/ML
1000 INJECTION, SOLUTION INTRAVENOUS EVERY 30 MIN PRN
Status: DISCONTINUED | OUTPATIENT
Start: 2024-07-03 | End: 2024-07-07 | Stop reason: HOSPADM

## 2024-07-03 RX ORDER — ACETAMINOPHEN 10 MG/ML
1000 INJECTION, SOLUTION INTRAVENOUS
Status: DISCONTINUED | OUTPATIENT
Start: 2024-07-03 | End: 2024-07-03

## 2024-07-03 RX ORDER — OXYTOCIN-SODIUM CHLORIDE 0.9% IV SOLN 30 UNIT/500ML 30-0.9/5 UT/ML-%
10 SOLUTION INTRAVENOUS ONCE AS NEEDED
Status: DISCONTINUED | OUTPATIENT
Start: 2024-07-03 | End: 2024-07-03

## 2024-07-03 RX ORDER — OXYCODONE HYDROCHLORIDE 5 MG/1
10 TABLET ORAL EVERY 4 HOURS PRN
Status: DISCONTINUED | OUTPATIENT
Start: 2024-07-03 | End: 2024-07-07 | Stop reason: HOSPADM

## 2024-07-03 RX ORDER — DIPHENOXYLATE HYDROCHLORIDE AND ATROPINE SULFATE 2.5; .025 MG/1; MG/1
2 TABLET ORAL EVERY 6 HOURS PRN
Status: DISCONTINUED | OUTPATIENT
Start: 2024-07-03 | End: 2024-07-07 | Stop reason: HOSPADM

## 2024-07-03 RX ORDER — DIPHENHYDRAMINE HYDROCHLORIDE 50 MG/ML
25 INJECTION INTRAMUSCULAR; INTRAVENOUS EVERY 4 HOURS PRN
Status: ACTIVE | OUTPATIENT
Start: 2024-07-03 | End: 2024-07-06

## 2024-07-03 RX ORDER — NALBUPHINE HYDROCHLORIDE 10 MG/ML
5 INJECTION, SOLUTION INTRAMUSCULAR; INTRAVENOUS; SUBCUTANEOUS EVERY 4 HOURS PRN
Status: DISCONTINUED | OUTPATIENT
Start: 2024-07-03 | End: 2024-07-03

## 2024-07-03 RX ORDER — SODIUM CHLORIDE 9 MG/ML
INJECTION, SOLUTION INTRAVENOUS CONTINUOUS
Status: DISCONTINUED | OUTPATIENT
Start: 2024-07-03 | End: 2024-07-07 | Stop reason: HOSPADM

## 2024-07-03 RX ORDER — OXYTOCIN-SODIUM CHLORIDE 0.9% IV SOLN 30 UNIT/500ML 30-0.9/5 UT/ML-%
SOLUTION INTRAVENOUS
Status: DISCONTINUED | OUTPATIENT
Start: 2024-07-03 | End: 2024-07-03

## 2024-07-03 RX ORDER — ONDANSETRON 4 MG/1
8 TABLET, ORALLY DISINTEGRATING ORAL EVERY 8 HOURS PRN
Status: DISCONTINUED | OUTPATIENT
Start: 2024-07-03 | End: 2024-07-07 | Stop reason: HOSPADM

## 2024-07-03 RX ORDER — PROCHLORPERAZINE EDISYLATE 5 MG/ML
5 INJECTION INTRAMUSCULAR; INTRAVENOUS EVERY 6 HOURS PRN
Status: DISCONTINUED | OUTPATIENT
Start: 2024-07-03 | End: 2024-07-07 | Stop reason: HOSPADM

## 2024-07-03 RX ORDER — OXYTOCIN-SODIUM CHLORIDE 0.9% IV SOLN 30 UNIT/500ML 30-0.9/5 UT/ML-%
95 SOLUTION INTRAVENOUS ONCE AS NEEDED
Status: DISCONTINUED | OUTPATIENT
Start: 2024-07-03 | End: 2024-07-07 | Stop reason: HOSPADM

## 2024-07-03 RX ORDER — OXYTOCIN 10 [USP'U]/ML
10 INJECTION, SOLUTION INTRAMUSCULAR; INTRAVENOUS ONCE AS NEEDED
Status: DISCONTINUED | OUTPATIENT
Start: 2024-07-03 | End: 2024-07-07 | Stop reason: HOSPADM

## 2024-07-03 RX ORDER — ADHESIVE BANDAGE
30 BANDAGE TOPICAL 2 TIMES DAILY PRN
Status: DISCONTINUED | OUTPATIENT
Start: 2024-07-04 | End: 2024-07-07 | Stop reason: HOSPADM

## 2024-07-03 RX ADMIN — IBUPROFEN 600 MG: 400 TABLET ORAL at 05:07

## 2024-07-03 RX ADMIN — MORPHINE SULFATE 2.5 MG: 0.5 INJECTION, SOLUTION EPIDURAL; INTRATHECAL; INTRAVENOUS at 07:07

## 2024-07-03 RX ADMIN — ONDANSETRON 4 MG: 2 INJECTION INTRAMUSCULAR; INTRAVENOUS at 07:07

## 2024-07-03 RX ADMIN — METFORMIN HYDROCHLORIDE 500 MG: 500 TABLET, FILM COATED ORAL at 05:07

## 2024-07-03 RX ADMIN — Medication 30 UNITS: at 08:07

## 2024-07-03 RX ADMIN — ACETAMINOPHEN 1000 MG: 10 INJECTION, SOLUTION INTRAVENOUS at 07:07

## 2024-07-03 RX ADMIN — SODIUM CHLORIDE, SODIUM LACTATE, POTASSIUM CHLORIDE, AND CALCIUM CHLORIDE: .6; .31; .03; .02 INJECTION, SOLUTION INTRAVENOUS at 07:07

## 2024-07-03 RX ADMIN — Medication 30 UNITS: at 07:07

## 2024-07-03 RX ADMIN — BUPIVACAINE HYDROCHLORIDE 1.5 ML: 7.5 INJECTION, SOLUTION EPIDURAL; RETROBULBAR at 07:07

## 2024-07-03 RX ADMIN — DOCUSATE SODIUM 200 MG: 100 CAPSULE, LIQUID FILLED ORAL at 09:07

## 2024-07-03 RX ADMIN — SODIUM CHLORIDE, POTASSIUM CHLORIDE, SODIUM LACTATE AND CALCIUM CHLORIDE 1000 ML: 600; 310; 30; 20 INJECTION, SOLUTION INTRAVENOUS at 05:07

## 2024-07-03 RX ADMIN — IBUPROFEN 600 MG: 400 TABLET ORAL at 12:07

## 2024-07-03 RX ADMIN — CEFAZOLIN 2 G: 330 INJECTION, POWDER, FOR SOLUTION INTRAMUSCULAR; INTRAVENOUS at 07:07

## 2024-07-03 NOTE — TRANSFER OF CARE
"Anesthesia Transfer of Care Note    Patient: Maxine Sanchez    Procedure(s) Performed: Procedure(s) (LRB):   SECTION (N/A)    Patient location: Labor and Delivery    Anesthesia Type: epidural and spinal    Transport from OR: Transported from OR on room air with adequate spontaneous ventilation    Post pain: adequate analgesia    Post assessment: no apparent anesthetic complications    Post vital signs: stable    Level of consciousness: awake    Nausea/Vomiting: no nausea/vomiting    Complications: none    Transfer of care protocol was followed    Last vitals: Visit Vitals  /66   Pulse 87   Temp 36.7 °C (98.1 °F) (Oral)   Resp 18   Ht 5' 5.5" (1.664 m)   Wt 121.6 kg (268 lb)   SpO2 98%   Breastfeeding Yes   BMI 43.92 kg/m²     "

## 2024-07-03 NOTE — NURSING TRANSFER
Nursing Transfer Note      7/3/2024   10:32 AM    Nurse giving handoff:DIAMOND Cruz  Nurse receiving handoff:DIAMOND Menjivar    Reason patient is being transferred: care complete    Transfer To: [facility]    Transfer via bed      Transported by DIAMOND Cruz    Transfer Vital Signs:  Blood Pressure:134/87  Heart Rate:79  O2:100  Temperature:97.7  Respirations:18      Order for Tele Monitor? No    Additional Lines: Khan Catheter    4eyes on Skin: yes    Medicines sent: no    Any special needs or follow-up needed: no    Patient belongings transferred with patient: Yes    Chart send with patient: Yes    Notified: spouse    Patient reassessed at: 7/3/24 1030   Upon arrival to floor: call bell in reach and bed in lowest position

## 2024-07-03 NOTE — HOSPITAL COURSE
29-year-old  1 para 0 at 37 weeks gestational age with type 2 diabetes admitted for primary  section secondary to breech presentation and per [facility] recommendation of delivery at 37 weeks secondary to morbid obesity and type 2 diabetes.  Patient on insulin for glucose control.  Patient underwent primary low transverse  section, please see operative note for details.

## 2024-07-03 NOTE — L&D DELIVERY NOTE
[facility]   Section   Operative Note    SUMMARY     Date of Procedure: 7/3/2024     Procedure: Procedure(s) (LRB):   SECTION (N/A)    Surgeons and Role:     * Ed Momin MD - Primary    Assisting Surgeon:  Mellissa Mari    Pre-Operative Diagnosis: Delivery by elective  section [O82] type 2 diabetes-insulin controlled, transverse position, morbid obesity    Post-Operative Diagnosis: Post-Op Diagnosis Codes:     * Delivery by elective  section [O82]  Same    Anesthesia: Epidural    Technical Procedures Used:  Primary low-transverse  section           Description of the Findings of the Procedure:  Infant with fetal head to maternal right side lower quadrant, normal-appearing female anatomy    Significant Surgical Tasks Conducted by the Assistant(s), if Applicable:  Typical    Complications: No    Blood Loss: * No values recorded between 7/3/2024  7:38 AM and 7/3/2024  8:10 AM * 600 cc     With patient in supine position, the legs are  and Khan Catheter placed and positioning to supine done.   Abdomen prepped with Chloroprep and 3 minute drying time allowed prior to draping of the abdomen.   Time out taken with OR team members.  Pfannenstiel Incision made through the skin, transverse fascial incision developed, rectus muscles  in the midline and the peritoneum entered.   no adhesions noted.  Gabriel wound retractor placed.  The lower uterine segment and position of the fetus identified.   Bladder flap taken down through transverse peritoneal incision.    Low Transverse Incision made through well developer lower uterine segment and extended laterally with blunt dissection.   Clear fluid noted.  Infant delivered from  transverse with fetal head right maternal side  presentation-converted to vertex.  Cord clamped after one minute and  handed to attending nurse.  Cord blood taken, placenta delivered.  The uterus wasnot  exteriorized.  The edges of the uterine incision are grasped with Cross clamps at the angles and the inferior and superior midline edges of the incision.    Closure with running lock 0 Monocryl, starting at each angle, tying in the midline.  A 2nd 0 Monocryl was used to imbricate the hysterotomy line.  Observation for bleeding with suture of any bleeding along the hysterotomy line.   With good hemostasis noted, the anterior pelvis is rinsed with sterile saline.   Right and left adnexa with normal anatomy.  Gabriel wound retractor removed.     Closure of the abdomen with 2 0 Vicryl running of the peritoneum, fascial closure with 0 l Maxon starting at the proximal angle and tying the knot at the distal angle.  Exparel injected.  Skin closure with 4 0 Vicryl subcuticular.  Wound dressed with Mepilex.          Specimens:   Specimen (24h ago, onward)      None            Condition: Good    Disposition: PACU - hemodynamically stable.    Attestation: Good     Viable female infant with Apgar scores 8/9, weight 5 lb 11 oz    Delivery Information for Girl Maxine Sanchez    Birth information:  YOB: 2024   Time of birth: 7:44 AM   Sex: female   Head Delivery Date/Time: 7/3/2024  7:44 AM   Delivery type: , Low Transverse   Gestational Age: 37w0d        Delivery Providers    Delivering clinician:            Measurements    Weight: 2575 g  Weight (lbs): 5 lb 10.8 oz  Length:          Apgars    Living status:   Apgar Component Scores:  1 min.:  5 min.:  10 min.:  15 min.:  20 min.:    Skin color:         Heart rate:         Reflex irritability:         Muscle tone:         Respiratory effort:         Total:                  Operative Delivery    Forceps attempted?: No  Vacuum extractor attempted?: No         Shoulder Dystocia    Shoulder dystocia present?: No           Presentation    Presentation: Vertex  Position: Occiput Anterior           Interventions/Resuscitation           Cord    Vessels: 3  vessels  Complications: None  Delayed Cord Clamping?: Yes  Cord Clamped Date/Time: 7/3/2024  7:45 AM  Cord Blood Disposition: Sent with Baby  Gases Sent?: No  Stem Cell Collection (by MD): No       Placenta    Placenta delivery date/time: 7/3/2024 0746  Placenta removal: Manual removal  Placenta appearance: Intact  Placenta disposition: Discarded           Labor Events:       labor:       Labor Onset Date/Time:         Dilation Complete Date/Time:         Start Pushing Date/Time:         Start Pushing Date/Time:       Rupture Date/Time:            Rupture type: INT (Intact)         Fluid Amount:       Fluid Color:                steroids:       Antibiotics given for GBS:       Induction:       Indications for induction:        Augmentation:       Indications for augmentation:       Labor complications:       Additional complications:          Cervical ripening:                     Delivery:      Episiotomy:       Indication for Episiotomy:       Perineal Lacerations:   Repaired:      Periurethral Laceration:   Repaired:     Labial Laceration:   Repaired:     Sulcus Laceration:   Repaired:     Vaginal Laceration:   Repaired:     Cervical Laceration:   Repaired:     Repair suture:       Repair # of packets:       Last Value - EBL - Nursing (mL):       Sum - EBL - Nursing (mL): 0     Last Value - EBL - Anesthesia (mL):      Calculated QBL (mL):       Running total QBL (mL):       Vaginal Sweep Performed:       Surgicount Correct:         Other providers:       Anesthesia    Method: Spinal, Epidural          Details (if applicable):  Trial of Labor No    Categorization: Primary    Priority: Routine   Indications for : Malposition   Incision Type: low transverse     Additional  information:  Forceps:    Vacuum:    Breech:    Observed anomalies    Other (Comments):

## 2024-07-03 NOTE — ANESTHESIA PROCEDURE NOTES
CSE    Patient location during procedure: OR  Start time: 7/3/2024 7:14 AM  Timeout: 7/3/2024 7:14 AM  End time: 7/3/2024 7:20 AM    Reason for block: at surgeon's request and post-op pain management    Staffing  Authorizing Provider: Mendel Gamez MD  Performing Provider: Mendel Gamez MD    Staffing  Performed by: Mendel Gamez MD  Authorized by: Mendel Gamez MD    Preanesthetic Checklist  Completed: patient identified, IV checked, site marked, risks and benefits discussed, surgical consent, monitors and equipment checked, pre-op evaluation and timeout performed  CSE  Patient position: sitting  Prep: Betadine  Patient monitoring: frequent blood pressure checks and cardiac monitor  Approach: midline  Spinal Needle  Needle type: pencil-tip   Needle gauge: 25 G  Needle length: 5 in  Epidural Needle  Injection technique: DESTINI air  Needle type: Tuohy   Needle gauge: 17 G  Needle length: 3.5 in  Needle insertion depth: 7 cm  Location: L4-5  Needle localization: anatomical landmarks   Catheter  Catheter type: Intraxio  Catheter size: 19 G  Catheter at skin depth: 10 cm  Test dose: lidocaine 1.5% with Epi 1-to-200,000 and negative  Test dose: 3 mL  Additional Documentation: negative aspiration for CSF, negative aspiration for heme, no paresthesia on injection and negative test dose  Assessment  Sensory level: T4   Dermatomal levels determined by alcohol swab  Intrathecal Medications:   administered: primary anesthetic mcg of    Additional Notes  No paresthesia with epidural needle insertion.  Momentary right leg paresthesia with spinal needle insertion.  Slow CSF return and so both needles withdrawn and epidural needle directed more leftward to epidural space again.  No paresthesia this time with spinal needle insertion.  Clear CSF per spinal needle.  Spinal bupivacaine injected easily.  Epidural catheter inserted easily.  No paresthesia with epidural catheter insertion.  Patient tolerated procedure  well.  Medications:    Medications: Intraspinal - bupivacaine (pf) (MARCAINE) injection 0.75% - Intraspinal   1.5 mL - 7/3/2024 7:19:00 AM

## 2024-07-03 NOTE — PLAN OF CARE
[facility]  Discharge Assessment    Primary Care Provider: No, Primary Doctor     OB Screen completed and no needs identified at this time.  White board in room updated with contact information, and mother was encouraged to contact office if further needs arise.    OB Screen (most recent)       OB Screen - 24 0801          OB SCREEN    Assessment Type Discharge Planning Assessment     Source of Information health record     Received Prenatal Care Yes     Any indications/suspicions for None     Is this a teen pregnancy No     Is the baby in NICU No     Indication for adoption/Safe Haven No     Indication for DME/post-acute needs No     HIV (+) No     Any congenital  disorders No     Fetal demise/ death No

## 2024-07-03 NOTE — NURSING
OBGYN LABS ENTERED INTO RESULTS CONSOLE      1st Trimester Labs Entered Into Results Console     [x] AOBRH   [x] Rubella Immune   [x] RPR   [x] HBsAg   [x] HIV   [x] Chlamydia   [x] Gonorrhea   [] Cell-Free DNA   [x] Hep-C   [] Varicella    2nd Trimester Labs Entered Into Results Console     []OB Glucose Screen      3rd Trimester Labs Entered Into Results Console      [x] GBS   [] RPR    Drug Screen Entered Into Results Console     [] Benzodiazes   [] Methadone   [] Cocaine (Metab)   [] Opiate   [] Amphetamine   [] Marijuana   [] Creatinine   [] Amphetamines-Beaker   [] Barbituates-Beaker   [] Benzodiazepine-Beaker   [] Cannabinoids-Beaker   [] Cocaine-Beaker   [] Fentanyl-Beaker   [] MDMA-Beaker   [] Opiates-Beaker   [] Phencyclidine-Beaker        Results Entered by Jake Elliott RN    Results Verified for Manual Entry Accuracy by Kenna Barton, RN

## 2024-07-03 NOTE — LACTATION NOTE
This note was copied from a baby's chart.  Mom reports that she just breast fed baby for about 10 minutes to her left side. Discussed breastfeeding basics. Instructed mom to call lactation for assistance @ the next feeding to verify correct latch. Mom verbalized understanding

## 2024-07-03 NOTE — LACTATION NOTE
This note was copied from a baby's chart.     24 1225   Maternal Assessment   Breast Size Issue none   Breast Shape round   Breast Density soft   Areola dense   Nipples Right:;everted;short   Maternal Infant Feeding   Maternal Emotional State assist needed   Infant Positioning clutch/football   Signs of Milk Transfer audible swallow   Pain with Feeding no   Latch Assistance yes     Assisted with position & latch. Instructed mom to compress breast for a deeper latch on. Good nutritive sucking & swallowing noted. Your Guide to Postpartum & [place] Care booklet given and  breastfeeding chapter reviewed.  Discussed:    Supply and Demand:  The more you nurse the baby the more milk you will make.  Avoid bottles and pacifiers for the first 4 weeks.  Feed your baby only breastmilk for the first 6 months per AAP guidelines.  Feed your baby On Cue at the earliest sign of hunger or need for comfort:  Sucking on fingers or hands  Bringing hands toward his mouth  Rooting or reaching for something to suck on  Sucking motions with mouth  Fretful noises  Crying is a late sign of hunger or comfort.  The baby should be positioned and latched on to the breast correctly  Chest-to-chest, chin in the breast  Babys lips are flipped outward  Babys mouth is stretched open wide like a shout  Babys sucking should feel like tugging to the mother  - The baby should be drinking at the breast  You should hear an occasional swallow during the feeding  Switch breasts when the baby takes himself off the breast or falls asleep  Keep offering breasts until the baby looks full, no longer gives hunger signs, and stays asleep when placed on his back in the crib  - If the baby is sleepy and wont wake for a feeding, put the baby skin-to-skin dressed in a diaper against the mothers bare chest  - Sleep with your baby near you in the hospital room  - Call the nurse/lactation consultant for additional assistance as needed.    States understand  and verbalized appropriate recall of all information.

## 2024-07-03 NOTE — NURSING
[facility]  Department of OBGYN  OB Summary        PATIENT NAME: Maxine Sanchez                                                                                                                                                                       AGE: 29 y.o.                                                                                          MRN: [id number]  PATIENT LOCATION:  2309/2309A  ADMIT DATE: 7/3/2024  TODAY'S DATE: 2024 Hospital Day: 1  MARY: Estimated Date of Delivery: 24  GA: 37w0d     OB HISTORY:    OB History    Para Term  AB Living   1 1 1   0 1   SAB IAB Ectopic Multiple Live Births         0 1      # Outcome Date GA Lbr Vincent/2nd Weight Sex Type Anes PTL Lv   1 Term 24 37w0d  2.575 kg (5 lb 10.8 oz) F CS-LTranv Spinal, EPI  DANY       PRE-PROCEDURE DIAGNOSIS: Pregnancy    PROCEDURE:   Procedure(s) (LRB):   SECTION (N/A)       MATERNAL LABS   Lab Results   Component Value Date    GROUPTRH O POS 2024    HGB 11.2 (L) 2024    HCT 34.6 (L) 2024     2024    RUBELLAIMMUN Immune 2023    HEPBSAG Negative 2023    [id number] NonReactive 2023    RPR NonReactive 2023    STREPBCULT Negative 2024       Fentanyl, Urine   Date Value Ref Range Status   2024 Negative @MANNY Negative Final     Comment:     The cut-off value is 5.0 ng/mL. This is a screening test. If results   do not   correlate with clinical presentation then a confirmatory send out   test is   advised. This result is intended for use in clinical management. It   is not   intended for use in employment related testing.         Benzodiazepines   Date Value Ref Range Status   2024 Negative Negative Final     Cocaine (Metab.)   Date Value Ref Range Status   2024 Negative Negative Final     Opiate Scrn, Ur   Date Value Ref Range Status   2024 Negative Negative Final     Barbiturate Screen, Ur   Date Value Ref Range  Status   07/03/2024 Negative Negative Final     Amphetamine Screen, Ur   Date Value Ref Range Status   07/03/2024 Negative Negative Final     THC   Date Value Ref Range Status   07/03/2024 Negative Negative Final     Phencyclidine   Date Value Ref Range Status   07/03/2024 Negative Negative Final     BUPRENORPHINE   Date Value Ref Range Status   07/03/2024 Negative  Final     Comment:     Buprenorphine urine screening threshold: 5 ng/mL.    This report is intended for use in clinical monitoring and management   of   patients only.          SPECIMENS  Specimen (24h ago, onward)      None             Antibiotics (From admission, onward)      Start     Stop Route Frequency Ordered    07/03/24 0915  mupirocin 2 % ointment         07/08/24 0859 Nasl 2 times daily 07/03/24 0907            INPATIENT MEDICATIONS  Current Facility-Administered Medications   Medication Dose Route Frequency Provider Last Rate Last Admin    0.9%  NaCl infusion   Intravenous Continuous Ed Momin MD        bisacodyL suppository 10 mg  10 mg Rectal Once PRN Ed Momin MD        carboprost injection 250 mcg  250 mcg Intramuscular Q15 Min PRN Ed Momin MD        diphenhydrAMINE capsule 25 mg  25 mg Oral Q4H PRN Ed Momin MD        diphenhydrAMINE injection 25 mg  25 mg Intravenous Q4H PRN Ed Momin MD        diphenoxylate-atropine 2.5-0.025 mg per tablet 2 tablet  2 tablet Oral Q6H PREd Schreiber MD        docusate sodium capsule 200 mg  200 mg Oral BID Ed Momin MD        HYDROmorphone injection 1.5 mg  1.5 mg Intravenous Q4H PREd Schreiber MD        ibuprofen tablet 600 mg  600 mg Oral Q6H Ed Momin MD        insulin aspart U-100 injection 14 Units  14 Units Subcutaneous TID AC Ed Momin MD        insulin NPH injection 42 Units  42 Units Subcutaneous QHS Ed Momin MD        lanolin cream   Topical (Top) Continuous PRN Ed Momin MD        [START ON  7/4/2024] magnesium hydroxide 400 mg/5 ml suspension 2,400 mg  30 mL Oral BID PRN Ed Momin MD        measles, mumps and rubella vaccine 1,000-12,500 TCID50/0.5 mL injection 0.5 mL  0.5 mL Subcutaneous vaccine x 1 dose Ed Momin MD        metFORMIN tablet 500 mg  500 mg Oral Daily with dinner Ed Momin MD        methylergonovine injection 200 mcg  200 mcg Intramuscular Once PRN Ed Momin MD        miSOPROStoL tablet 800 mcg  800 mcg Rectal Once PRN Ed Momin MD        miSOPROStoL tablet 800 mcg  800 mcg Oral Once PRN Ed Momin MD        mupirocin 2 % ointment   Nasal BID Ed Momin MD        ondansetron disintegrating tablet 8 mg  8 mg Oral Q8H PRN Ed Momin MD        ondansetron injection 4 mg  4 mg Intravenous Q6H PRN Ed Momin MD        oxyCODONE immediate release tablet 10 mg  10 mg Oral Q4H PRN Ed Momin MD        oxyCODONE-acetaminophen  mg per tablet 1 tablet  1 tablet Oral Q4H PRN Ed Momin MD        oxyCODONE-acetaminophen 5-325 mg per tablet 1 tablet  1 tablet Oral Q4H PRN Ed Momin MD        oxytocin 30 units in 500 mL normal saline infusion (loading dose)  10.0002 Units Intravenous Once PRN Ed Momin MD        oxytocin 30 units in 500 mL normal saline infusion  95 anastasiia-units/min Intravenous Once Ed Momin MD        oxytocin 30 units in 500 mL normal saline infusion  95 anastasiia-units/min Intravenous Once PRN Ed Momin MD        oxytocin injection 10 Units  10 Units Intramuscular Once PRN Ed Momin MD        prenatal vitamin oral tablet  1 tablet Oral Daily Ed Momin MD        prochlorperazine injection Soln 5 mg  5 mg Intravenous Q6H PRN Ed Momin MD        senna-docusate 8.6-50 mg per tablet 1 tablet  1 tablet Oral BID PRN Ed Momin MD        Tdap vaccine injection 0.5 mL  0.5 mL Intramuscular vaccine x 1 dose Ed Momin MD        tranexamic  "acid in NaCl,iso-os IVPB 1,000 mg  1,000 mg Intravenous Q30 Min PRN Ed Momin MD           OUTPATIENT MEDICATIONS  Current Outpatient Medications   Medication Instructions    aspirin (ECOTRIN) 81 mg, Oral, Daily    HumuLIN N NPH U-100 Insulin 42 Units, Subcutaneous, Nightly    insulin syringe-needle U-100 0.5 mL 31 gauge x 5/16" Syrg Use 4 (four) times daily.    loratadine (CLARITIN) 10 mg, Oral, Daily    metFORMIN (GLUCOPHAGE) 500 mg, Oral, With dinner    NovoLOG U-100 Insulin aspart 14 Units, Subcutaneous, 3 times daily before meals    prenatal vit no.124/iron/folic (PRENATAL VITAMIN ORAL) Oral    valACYclovir (VALTREX) 4,000 mg, Once       VITAL SIGNS (Most Recent)  Temp: 97.7 °F (36.5 °C) (24 0820)  Pulse: 67 (24 0950)  Resp: 16 (24 0820)  BP: 139/80 (24 0950)  SpO2: 98 % (24 0950)  Temp (36hrs), Av.9 °F (36.6 °C), Min:97.7 °F (36.5 °C), Max:98.1 °F (36.7 °C)      Delivery Information for Roseanne Sanchez    Birth information:  YOB: 2024   Time of birth: 7:44 AM   Sex: female   Head Delivery Date/Time: 7/3/2024  7:44 AM   Delivery type: , Low Transverse   Gestational Age: 37w0d        Delivery Providers    Delivering clinician: Ed Momin MD   Provider Role    Mellissa Santiago CSFA Carter, Toki W, RN Faciane, Melinda M Barlow, Jessica, Zita Herrera, María Foster RN               Measurements    Weight: 2575 g  Weight (lbs): 5 lb 10.8 oz  Length: 47 cm  Length (in): 18.5"         Apgars    Living status: Living  Apgar Component Scores:  1 min.:  5 min.:  10 min.:  15 min.:  20 min.:    Skin color:  0  1       Heart rate:  2  2       Reflex irritability:  2  2       Muscle tone:  2  2       Respiratory effort:  2  2       Total:  8  9       Apgars assigned by: TOBY HERNANDEZ         Operative Delivery    Forceps attempted?: No  Vacuum extractor attempted?: No         Shoulder Dystocia    Shoulder dystocia " present?: No           Presentation    Presentation: Vertex  Position: Occiput Anterior           Interventions/Resuscitation    Method: Bulb Suctioning, Tactile Stimulation, CPAP, Deep Suctioning       Cord    Vessels: 3 vessels  Complications: None  Delayed Cord Clamping?: Yes  Cord Clamped Date/Time: 7/3/2024  7:45 AM  Cord Blood Disposition: Sent with Baby  Gases Sent?: No  Stem Cell Collection (by MD): No       Placenta    Placenta delivery date/time: 7/3/2024 0746  Placenta removal: Manual removal  Placenta appearance: Intact  Placenta disposition: Discarded           Labor Events:       labor:       Labor Onset Date/Time:         Dilation Complete Date/Time:         Start Pushing Date/Time:       Rupture Date/Time:            Rupture type: INT (Intact)         Fluid Amount:       Fluid Color:        steroids:       Antibiotics given for GBS:       Induction:       Indications for induction:        Augmentation:       Indications for augmentation:       Labor complications:       Additional complications:          Cervical ripening:                     Delivery:      Episiotomy:       Indication for Episiotomy:       Perineal Lacerations:   Repaired:      Periurethral Laceration:   Repaired:     Labial Laceration:   Repaired:     Sulcus Laceration:   Repaired:     Vaginal Laceration:   Repaired:     Cervical Laceration:   Repaired:     Repair suture:       Repair # of packets:       Last Value - EBL - Nursing (mL):       Sum - EBL - Nursing (mL): 0     Last Value - EBL - Anesthesia (mL):      Calculated QBL (mL): 390      Running total QBL (mL): 390      Vaginal Sweep Performed:       Surgicount Correct:         Other providers:       Anesthesia    Method: Spinal, Epidural          Details (if applicable):  Trial of Labor No    Categorization: Primary    Priority: Routine   Indications for : Malposition   Incision Type: low transverse     Additional   information:  Forceps:    Vacuum:    Breech:    Observed anomalies    Other (Comments):           Time ruptured: rupture date, rupture time, delivery date, or delivery time have not been documented    SKIN TO SKIN                INFANT FEEDING       PAST MEDICAL HISTORY   Past Medical History:   Diagnosis Date    Diabetes mellitus, type 2     HSV-1 (herpes simplex virus 1) infection     Psoriasis         PAST SURGICAL HISTORY    Past Surgical History:   Procedure Laterality Date    TONSILLECTOMY          SOCIAL HISTORY    Social History     Tobacco Use    Smoking status: Never    Smokeless tobacco: Never   Substance Use Topics    Alcohol use: Not Currently    Drug use: Never                     Anthony Foote RN  Date of Service: 07/03/2024  10:17 AM

## 2024-07-03 NOTE — NURSING
"Formerly [facility], [facility]  Department of Obstetrics and Gynecology  PATIENT NAME: Maxine Sanchez  MRN: [id number]  TODAY'S DATE: 2024    CHIEF COMPLAINT:  section (Primary c/s for breech)      OB History    Para Term  AB Living   1 0 0 0 0 0   SAB IAB Ectopic Multiple Live Births   0 0 0 0 0      # Outcome Date GA Lbr Vincent/2nd Weight Sex Type Anes PTL Lv   1 Current              Past Medical History:   Diagnosis Date    Diabetes mellitus, type 2     Psoriasis      Past Surgical History:   Procedure Laterality Date    TONSILLECTOMY       Social History     Tobacco Use    Smoking status: Never    Smokeless tobacco: Never   Substance Use Topics    Alcohol use: Not Currently    Drug use: Never       Prenatal Labs  Lab Results   Component Value Date    GROUPTRH O POS 2023    RUBELLAIMMUN Immune 2023    HEPBSAG Negative 2023    [id number] NonReactive 2023    RPR NonReactive 2023    STREPBCULT Negative 2024       VITAL SIGNS - ABNORMAL VITALS INCLUDE TEMP >100.4,RR <12 or >26, SUSTAINED MATERNAL PULSE <60 or >120     VITAL SIGNS (Most Recent)  Temp: 98.1 °F (36.7 °C) (24)  Pulse: 87 (24)  Resp: 18 (24)  BP: 118/66 (24)  SpO2: 98 % (24)    OUTPATIENT MEDICATIONS  Current Outpatient Medications   Medication Instructions    aspirin (ECOTRIN) 81 mg, Oral, Daily    HumuLIN N NPH U-100 Insulin 42 Units, Subcutaneous, Nightly    insulin syringe-needle U-100 0.5 mL 31 gauge x 5/16" Syrg Use 4 (four) times daily.    loratadine (CLARITIN) 10 mg, Oral, Daily    metFORMIN (GLUCOPHAGE) 500 mg, Oral, With dinner    NovoLOG U-100 Insulin aspart 14 Units, Subcutaneous, 3 times daily before meals    prenatal vit no.124/iron/folic (PRENATAL VITAMIN ORAL) Oral    valACYclovir (VALTREX) 4,000 mg, Once       Jake Elliott RN  [facility]  2024     "

## 2024-07-03 NOTE — NURSING
Nurses Note -- 4 Eyes      7/3/2024   6:01 AM      Skin assessed during: Admit      [x] No Altered Skin Integrity Present    [x]Prevention Measures Documented      [] Yes- Altered Skin Integrity Present or Discovered   [] LDA Added if Not in Epic (Describe Wound)   [] New Altered Skin Integrity was Present on Admit and Documented in LDA   [] Wound Image Taken    Wound Care Consulted? No    Attending Nurse:   Jake Elliott RN    Second RN/Staff Member:   Kenna Barton RN

## 2024-07-03 NOTE — ANESTHESIA PREPROCEDURE EVALUATION
07/03/2024  Maxine Sanchez is a 29 y.o., female.      Pre-op Assessment    I have reviewed the Patient Summary Reports.     I have reviewed the Nursing Notes. I have reviewed the NPO Status.   I have reviewed the Medications.     Review of Systems  Anesthesia Hx:             Denies Family Hx of Anesthesia complications.    Denies Personal Hx of Anesthesia complications.                    Social:  Non-Smoker, No Alcohol Use       Hematology/Oncology:  Hematology Normal   Oncology Normal                                   EENT/Dental:   Cracked left lower molar          Cardiovascular:  Cardiovascular Normal                                            Pulmonary:         Sleep apnea suspected.  Patient has not received the results of her sleep test yet.               Renal/:  Renal/ Normal                 Hepatic/GI:  Hepatic/GI Normal                 Musculoskeletal:  Musculoskeletal Normal                Neurological:  Neurology Normal                                      Endocrine:  Diabetes (Chronic, for the past 2 years), well controlled, type 2, using insulin         Obesity / BMI > 30  Dermatological:  Psoriasis   Psych:  Psychiatric Normal                    Physical Exam  General: Well nourished, Cooperative, Alert and Oriented    Airway:  Mallampati: I   Mouth Opening: Normal  TM Distance: > 6 cm  Tongue: Normal  Neck ROM: Normal ROM    Dental:  Intact    Chest/Lungs:  Clear to auscultation, Normal Respiratory Rate    Heart:  Rate: Normal  Rhythm: Regular Rhythm  Sounds: Normal        Anesthesia Plan  Type of Anesthesia, risks & benefits discussed:    Anesthesia Type: CSE  Intra-op Monitoring Plan: Standard ASA Monitors  Post Op Pain Control Plan: multimodal analgesia  Informed Consent: Informed consent signed with the Patient and all parties understand the risks and agree with anesthesia plan.  All  questions answered.   ASA Score: 3  Anesthesia Plan Notes: Epidural morphine, IV acetaminophen, Zofran    Ready For Surgery From Anesthesia Perspective.     .

## 2024-07-04 LAB
BASOPHILS # BLD AUTO: 0.04 K/UL (ref 0–0.2)
BASOPHILS NFR BLD: 0.3 % (ref 0–1.9)
DIFFERENTIAL METHOD BLD: ABNORMAL
EOSINOPHIL # BLD AUTO: 0.1 K/UL (ref 0–0.5)
EOSINOPHIL NFR BLD: 0.7 % (ref 0–8)
ERYTHROCYTE [DISTWIDTH] IN BLOOD BY AUTOMATED COUNT: 14.3 % (ref 11.5–14.5)
GLUCOSE SERPL-MCNC: 125 MG/DL (ref 70–110)
GLUCOSE SERPL-MCNC: 202 MG/DL (ref 70–110)
GLUCOSE SERPL-MCNC: 74 MG/DL (ref 70–110)
GLUCOSE SERPL-MCNC: 80 MG/DL (ref 70–110)
GLUCOSE SERPL-MCNC: 84 MG/DL (ref 70–110)
GLUCOSE SERPL-MCNC: 90 MG/DL (ref 70–110)
HCT VFR BLD AUTO: 34.3 % (ref 37–48.5)
HGB BLD-MCNC: 11.1 G/DL (ref 12–16)
IMM GRANULOCYTES # BLD AUTO: 0.06 K/UL (ref 0–0.04)
IMM GRANULOCYTES NFR BLD AUTO: 0.5 % (ref 0–0.5)
LYMPHOCYTES # BLD AUTO: 1.9 K/UL (ref 1–4.8)
LYMPHOCYTES NFR BLD: 15.9 % (ref 18–48)
MCH RBC QN AUTO: 27 PG (ref 27–31)
MCHC RBC AUTO-ENTMCNC: 32.4 G/DL (ref 32–36)
MCV RBC AUTO: 84 FL (ref 82–98)
MONOCYTES # BLD AUTO: 0.5 K/UL (ref 0.3–1)
MONOCYTES NFR BLD: 4.2 % (ref 4–15)
NEUTROPHILS # BLD AUTO: 9.6 K/UL (ref 1.8–7.7)
NEUTROPHILS NFR BLD: 78.4 % (ref 38–73)
NRBC BLD-RTO: 0 /100 WBC
PLATELET # BLD AUTO: 262 K/UL (ref 150–450)
PMV BLD AUTO: 10.3 FL (ref 9.2–12.9)
RBC # BLD AUTO: 4.11 M/UL (ref 4–5.4)
WBC # BLD AUTO: 12.2 K/UL (ref 3.9–12.7)

## 2024-07-04 PROCEDURE — 25000003 PHARM REV CODE 250: Performed by: SPECIALIST

## 2024-07-04 PROCEDURE — 85025 COMPLETE CBC W/AUTO DIFF WBC: CPT | Performed by: SPECIALIST

## 2024-07-04 PROCEDURE — 36415 COLL VENOUS BLD VENIPUNCTURE: CPT | Performed by: SPECIALIST

## 2024-07-04 PROCEDURE — 12000002 HC ACUTE/MED SURGE SEMI-PRIVATE ROOM

## 2024-07-04 RX ADMIN — DOCUSATE SODIUM 200 MG: 100 CAPSULE, LIQUID FILLED ORAL at 09:07

## 2024-07-04 RX ADMIN — IBUPROFEN 600 MG: 400 TABLET ORAL at 11:07

## 2024-07-04 RX ADMIN — IBUPROFEN 600 MG: 400 TABLET ORAL at 05:07

## 2024-07-04 RX ADMIN — IBUPROFEN 600 MG: 400 TABLET ORAL at 06:07

## 2024-07-04 RX ADMIN — Medication: at 11:07

## 2024-07-04 RX ADMIN — METFORMIN HYDROCHLORIDE 500 MG: 500 TABLET, FILM COATED ORAL at 04:07

## 2024-07-04 RX ADMIN — PRENATAL VIT W/ FE FUMARATE-FA TAB 27-0.8 MG 1 TABLET: 27-0.8 TAB at 09:07

## 2024-07-04 NOTE — SUBJECTIVE & OBJECTIVE
Interval History:  Pod 1.-status post B-fwrddvh-my complaints    She is doing well this morning. She is tolerating a regular diet without nausea or vomiting. She is voiding spontaneously. She is ambulating. She has passed flatus, and has not a BM. Vaginal bleeding is mild. She denies fever or chills. Abdominal pain is mild and controlled with oral medications.     Objective:     Vital Signs (Most Recent):  Temp: 98.3 °F (36.8 °C) (24)  Pulse: 76 (24)  Resp: 18 (24)  BP: 111/75 (24)  SpO2: 98 % (24) Vital Signs (24h Range):  Temp:  [97.6 °F (36.4 °C)-98.8 °F (37.1 °C)] 98.3 °F (36.8 °C)  Pulse:  [60-79] 76  Resp:  [16-18] 18  SpO2:  [97 %-100 %] 98 %  BP: (111-146)/(63-94) 111/75     Weight: 121.6 kg (268 lb)  Body mass index is 43.92 kg/m².      Intake/Output Summary (Last 24 hours) at 2024 0707  Last data filed at 2024 0600  Gross per 24 hour   Intake 1000 ml   Output 7990 ml   Net -6990 ml         Significant Labs:  Lab Results   Component Value Date    GROUPTRH O POS 2024    HEPBSAG Negative 2023    STREPBCULT Negative 2024     Recent Labs   Lab 24  0350   HGB 11.1*   HCT 34.3*       I have personallly reviewed all pertinent lab results from the last 24 hours.    Physical Exam  General-no distress resting comfortably   Abdomen-soft nontender with active bowel sounds   Uterus-firm below the umbilicus  Incision/bandage clean dry intact   Lochia-minimal   Extremities-no calf tenderness  Review of Systems

## 2024-07-04 NOTE — LACTATION NOTE
07/04/24 0940   Maternal Assessment   Breast Density Bilateral:;soft   Areola Bilateral:;elastic   Nipples Right:;short;graspable   Maternal Infant Feeding   Maternal Emotional State assist needed   Infant Positioning clutch/football   Signs of Milk Transfer audible swallow;infant jaw motion present   Pain with Feeding no   Comfort Measures Before/During Feeding infant position adjusted;latch adjusted;maternal position adjusted   Latch Assistance yes     Assisted to latch baby to right breast in football position. Baby tongue thrusting, not maintaining latch. Utilized nipple shield to drop tongue. Baby latched deeply to shield after several attempts, nursing well with audible swallows. Removed shield after several minutes and baby latched deeply to bare breast. Mother denies pain during feeding with deep latch. Reviewed basic breastfeeding instructions and proper usage of nipple shield. Encouraged to call me for any further breastfeeding assistance. Patient verbalizes understanding of all instructions with good recall.    Instructed on proper latch to facilitate effective breastfeeding.  Discussed recognizing hunger cues, appropriate positioning and wide mouth latch.  Discussed ways to determine an effective latch including:  areola included in latch, rhythmic/nutritive sucking and audible swallowing.  Also discussed soreness/tenderness associated with latch and prevention and treatment.  Pt states understanding and verbalized appropriate recall.    Instructed on the need for a nipple shield and appropriate use:  Nipple Shield Instructions for use:  Wash hands prior to touching the shield  Moisten the edge of the nipple shield with water or lanolin before applying to help it stay in place  Turn shield custodial inside out and center over nipple and areola  Slowly roll shield over the nipple and areola and smooth down edges.  The cut-out portion of the contact nipple shield should be positioned under the babys  nose.  Hand express a little milk into the teat to facilitate latch.  Latch baby onto breast and shield so that part of the areola is in the babys mouth.  Do not latch baby only onto the teat of the shield.  Breastfeed  until baby is content  While breastfeeding with a nipple shield, baby should be under close supervision for output to monitor adequate transfer of milk and milk supply.  This should be continued until the milk supply is fully established and the infant is consistently gaining weight.    Continued use of, and or weaning from the use of, the nipple shield should be done under the supervision of a health care provider.    Cleaning and Sanitizing:  After each use, rinse in cool water to remove breast milk  Wash in warm, soapy water  Rinse with clear water  Sanitize daily by following the instructions on [employer]s Quick Clean Micro-Steam bag or by boiling for 10min.  The nipple shield should not rest on the bottom or sides of the pot while boiling.  Allow nipple shield to air dry in a clean area and store dry with the nipple facing upward    States understanding and appropriate recall of all information, including return demonstration of use.

## 2024-07-04 NOTE — ASSESSMENT & PLAN NOTE
Pod 1.-status post W-xahknxo-uatwv well   Advances orders   Type 2 diabetes-glucose levels within normal limits, not requiring insulin after delivery

## 2024-07-04 NOTE — PROGRESS NOTES
[facility]  Obstetrics  Postpartum Progress Note    Patient Name: Maxine Sanchez  MRN: [id number]  Admission Date: 7/3/2024  Hospital Length of Stay: 1 days  Attending Physician: Ed Momin MD  Primary Care Provider: Rylee, Primary Doctor    Subjective:     Principal Problem:Pregnancy    Hospital Course:  29-year-old  1 para 0 at 37 weeks gestational age with type 2 diabetes admitted for primary  section secondary to breech presentation and per [facility] recommendation of delivery at 37 weeks secondary to morbid obesity and type 2 diabetes.  Patient on insulin for glucose control.  Patient underwent primary low transverse  section, please see operative note for details.    Interval History:  Pod 1.-status post K-vzfqimb-cv complaints    She is doing well this morning. She is tolerating a regular diet without nausea or vomiting. She is voiding spontaneously. She is ambulating. She has passed flatus, and has not a BM. Vaginal bleeding is mild. She denies fever or chills. Abdominal pain is mild and controlled with oral medications.     Objective:     Vital Signs (Most Recent):  Temp: 98.3 °F (36.8 °C) (24)  Pulse: 76 (24)  Resp: 18 (24)  BP: 111/75 (24)  SpO2: 98 % (24) Vital Signs (24h Range):  Temp:  [97.6 °F (36.4 °C)-98.8 °F (37.1 °C)] 98.3 °F (36.8 °C)  Pulse:  [60-79] 76  Resp:  [16-18] 18  SpO2:  [97 %-100 %] 98 %  BP: (111-146)/(63-94) 111/75     Weight: 121.6 kg (268 lb)  Body mass index is 43.92 kg/m².      Intake/Output Summary (Last 24 hours) at 2024 0707  Last data filed at 2024 0600  Gross per 24 hour   Intake 1000 ml   Output 7990 ml   Net -6990 ml         Significant Labs:  Lab Results   Component Value Date    GROUPTRH O POS 2024    HEPBSAG Negative 2023    STREPBCULT Negative 2024     Recent Labs   Lab 24  0350   HGB 11.1*   HCT 34.3*       I have personallly reviewed all pertinent  lab results from the last 24 hours.    Physical Exam  General-no distress resting comfortably   Abdomen-soft nontender with active bowel sounds   Uterus-firm below the umbilicus  Incision/bandage clean dry intact   Lochia-minimal   Extremities-no calf tenderness  Review of Systems  Assessment/Plan:     29 y.o. female  for:    * Pregnancy  Pod 1.-status post G-jshrtnd-ngufq well   Advances orders   Type 2 diabetes-glucose levels within normal limits, not requiring insulin after delivery        Disposition: As patient meets milestones, will plan to discharge .    Rakesh Momin MD  Obstetrics  [facility]

## 2024-07-04 NOTE — ANESTHESIA POSTPROCEDURE EVALUATION
Anesthesia Post Evaluation    Patient: Maxine Sanchez    Procedure(s) Performed: Procedure(s) (LRB):   SECTION (N/A)    Final Anesthesia Type: CSE      Patient location: Postpartum.  Patient participation: Yes- Able to Participate  Level of consciousness: awake and alert  Post-procedure vital signs: reviewed and stable  Pain management: adequate  Airway patency: patent    PONV status at discharge: No PONV  Anesthetic complications: no      Cardiovascular status: blood pressure returned to baseline and stable  Respiratory status: unassisted and room air  Hydration status: euvolemic  Follow-up needed   Comments: Patient status post  with a combined spinal epidural.  The patient is doing well this morning.  She is alert and oriented without any over sedation.  She has return of her baseline motor and sensory function to the lower extremities. She denied any headache.  She had no back pain, and the epidural site is clean without signs of infection.  Her pain is well control with Duramorph and oral analgesics as needed.  She denies any nausea or vomiting.  She had some pruritus that is resolving.  There were no complications to combined spinal epidural.  We will sign out at this time. Please re-consult if needed for pain management.                  Vitals Value Taken Time   /82 24 0705   Temp 36.9 °C (98.4 °F) 24 0705   Pulse 72 24 0705   Resp 17 24 0705   SpO2 98 % 24 0705         No case tracking events are documented in the log.      Pain/Roshni Score: Pain Rating Prior to Med Admin: 4 (2024  5:05 AM)  Pain Rating Post Med Admin: 0 (2024  6:00 AM)

## 2024-07-05 LAB
GLUCOSE SERPL-MCNC: 145 MG/DL (ref 70–110)
GLUCOSE SERPL-MCNC: 159 MG/DL (ref 70–110)
GLUCOSE SERPL-MCNC: 79 MG/DL (ref 70–110)
GLUCOSE SERPL-MCNC: 87 MG/DL (ref 70–110)

## 2024-07-05 PROCEDURE — 12000002 HC ACUTE/MED SURGE SEMI-PRIVATE ROOM

## 2024-07-05 PROCEDURE — 25000003 PHARM REV CODE 250: Performed by: SPECIALIST

## 2024-07-05 RX ORDER — IBUPROFEN 600 MG/1
600 TABLET ORAL EVERY 6 HOURS PRN
Qty: 60 TABLET | Refills: 0 | Status: SHIPPED | OUTPATIENT
Start: 2024-07-05

## 2024-07-05 RX ORDER — METFORMIN HYDROCHLORIDE 500 MG/1
500 TABLET ORAL 2 TIMES DAILY WITH MEALS
Status: DISCONTINUED | OUTPATIENT
Start: 2024-07-05 | End: 2024-07-07 | Stop reason: HOSPADM

## 2024-07-05 RX ORDER — METFORMIN HYDROCHLORIDE 500 MG/1
1000 TABLET ORAL 2 TIMES DAILY WITH MEALS
Status: DISCONTINUED | OUTPATIENT
Start: 2024-07-05 | End: 2024-07-05

## 2024-07-05 RX ORDER — OXYCODONE AND ACETAMINOPHEN 5; 325 MG/1; MG/1
1 TABLET ORAL EVERY 4 HOURS PRN
Qty: 28 TABLET | Refills: 0 | Status: SHIPPED | OUTPATIENT
Start: 2024-07-05

## 2024-07-05 RX ADMIN — IBUPROFEN 600 MG: 400 TABLET ORAL at 05:07

## 2024-07-05 RX ADMIN — DOCUSATE SODIUM 200 MG: 100 CAPSULE, LIQUID FILLED ORAL at 09:07

## 2024-07-05 RX ADMIN — METFORMIN HYDROCHLORIDE 500 MG: 500 TABLET, FILM COATED ORAL at 03:07

## 2024-07-05 RX ADMIN — MUPIROCIN 1 G: 20 OINTMENT TOPICAL at 10:07

## 2024-07-05 RX ADMIN — PRENATAL VIT W/ FE FUMARATE-FA TAB 27-0.8 MG 1 TABLET: 27-0.8 TAB at 10:07

## 2024-07-05 RX ADMIN — IBUPROFEN 600 MG: 400 TABLET ORAL at 11:07

## 2024-07-05 RX ADMIN — MUPIROCIN 1 G: 20 OINTMENT TOPICAL at 09:07

## 2024-07-05 RX ADMIN — IBUPROFEN 600 MG: 400 TABLET ORAL at 06:07

## 2024-07-05 RX ADMIN — DOCUSATE SODIUM 200 MG: 100 CAPSULE, LIQUID FILLED ORAL at 10:07

## 2024-07-05 RX ADMIN — IBUPROFEN 600 MG: 400 TABLET ORAL at 12:07

## 2024-07-05 NOTE — PROGRESS NOTES
Delivery Progress Note  Obstetrics    SUBJECTIVE:     Postpartum Day 2  Delivery    Maxine Sanchez states she feels well and has no complaints. She denies emotional concerns. Her pain is well controlled with current medications. The patient is ambulating and voiding without difficulty. She is tolerating a diabetic diet. Flatus has been passed. Urinary output is adequate. Denies heavy bleeding.    OBJECTIVE:     Vitals:    24 0000 24 0605 24 0710 24 1105   BP: 130/72 121/78 139/87 135/89   BP Location: Left arm Right arm Right arm Right arm   Patient Position: Lying Sitting Sitting Sitting   Pulse: 68 71 65 72   Resp: 18 18 18 18   Temp: 97.8 °F (36.6 °C) 98.7 °F (37.1 °C) 98.2 °F (36.8 °C) 98.8 °F (37.1 °C)   TempSrc: Oral Oral Oral Oral   SpO2: 99% 98% 98% 97%   Weight:       Height:            I & O (Last 24H):  Intake/Output Summary (Last 24 hours)  No intake or output data in the 24 hours ending 24 1448      Physical Exam:  General: NAD, AAO   CV: Regular rate   Resp:   Nonlabored respirations   Abdomen: Soft, appropriately-tender; no rebound/guarding   Fundus: Firm   Incision:  Bandage c/d/i   Lochia:  Appropriate   DVT Evaluation: No evidence of DVT on either side seen on physical exam.  No calf tenderness     Labs:  CBC:   Lab Results   Component Value Date/Time    WBC 12.20 2024 03:50 AM    RBC 4.11 2024 03:50 AM    HGB 11.1 (L) 2024 03:50 AM    HCT 34.3 (L) 2024 03:50 AM     2024 03:50 AM    MCV 84 2024 03:50 AM    MCH 27.0 2024 03:50 AM    MCHC 32.4 2024 03:50 AM       ABO/Rh  O POS        ASSESSMENT/PLAN:     Status post  section, day 2. Doing well postoperatively.    Continue current care  Advance per protocol  Patient with type 2 diabetes:  Required insulin during her pregnancy.  Prior to pregnancy was on metformin 1000 mg b.i.d..  Was initially order for half of her insulin dose during  pregnancy, non given as her Accu-Cheks have been mostly at goal.  Insulin discontinued and will restart at 500 mg twice a day of metformin, and increase if Accu-Cheks above goal  Baby doing well  Dispo: anticipate discharge tomorrow if meeting all criteria and baby cleared for discharge    Britney Egan MD  Obstetrics and Gynecology  [facility]

## 2024-07-05 NOTE — PLAN OF CARE
Problem: Adult Inpatient Plan of Care  Goal: Plan of Care Review  Outcome: Progressing  Goal: Patient-Specific Goal (Individualized)  Outcome: Progressing  Goal: Absence of Hospital-Acquired Illness or Injury  Outcome: Progressing  Goal: Optimal Comfort and Wellbeing  Outcome: Progressing  Goal: Readiness for Transition of Care  Outcome: Progressing     Problem: Bariatric Environmental Safety  Goal: Safety Maintained with Care  Outcome: Progressing     Problem:  Fall Injury Risk  Goal: Absence of Fall, Infant Drop and Related Injury  Outcome: Progressing     Problem: Diabetes Comorbidity  Goal: Blood Glucose Level Within Targeted Range  Outcome: Progressing     Problem: Infection  Goal: Absence of Infection Signs and Symptoms  Outcome: Progressing     Problem:  Delivery  Goal: Bleeding is Controlled  Outcome: Progressing  Goal: Stable Fetal Wellbeing  Outcome: Progressing  Goal: Absence of Infection Signs and Symptoms  Outcome: Progressing  Goal: Effective Oxygenation and Ventilation  Outcome: Progressing     Problem: Fall Injury Risk  Goal: Absence of Fall and Fall-Related Injury  Outcome: Progressing     Problem: Breastfeeding  Goal: Effective Breastfeeding  Outcome: Progressing     Problem: Wound  Goal: Optimal Coping  Outcome: Progressing  Goal: Optimal Functional Ability  Outcome: Progressing  Goal: Absence of Infection Signs and Symptoms  Outcome: Progressing  Goal: Improved Oral Intake  Outcome: Progressing  Goal: Optimal Pain Control and Function  Outcome: Progressing  Goal: Skin Health and Integrity  Outcome: Progressing  Goal: Optimal Wound Healing  Outcome: Progressing     Problem: Skin Injury Risk Increased  Goal: Skin Health and Integrity  Outcome: Progressing

## 2024-07-05 NOTE — LACTATION NOTE
07/05/24 1305   Maternal Assessment   Breast Density Bilateral:;filling   Areola Bilateral:;elastic   Nipples Right:;short;graspable   Maternal Infant Feeding   Maternal Preparation   (Lactation nurse at bedside to asst. with feeding.)   Maternal Emotional State assist needed   Infant Positioning clutch/football   Signs of Milk Transfer audible swallow;infant jaw motion present   Pain with Feeding no   Comfort Measures Before/During Feeding infant position adjusted;latch adjusted;maternal position adjusted   Latch Assistance yes     Assisted to latch baby to right breast in football position with nipple shield in place. Utilized SNS for supplementation of DBM, due to hypoglycemia. Baby latched deeply, nursing well with audible swallows. Mother denies pain during feeding. Baby nursed for 15 minutes and transferred 25 ml DBM during feeding. Reviewed basic breastfeeding instructions and setup, usage and cleaning of SNS. Encouraged to call me for assistance with supplementation at next feeding. Patient verbalizes understanding of all instructions with good recall.

## 2024-07-05 NOTE — NURSING
0420 pt resting no acute distress noted.  Pt left undisturbed.  Infant on couch with  no acute distress noted.   denies needs at this time.

## 2024-07-05 NOTE — NURSING
2000 Report received assumed care.  Pt sitting up in bed holding infant, no acute distress noted.  Assessment done per flow. Breath sounds clear bilaterally; apical pulse strong and regular; bowel sounds active X 4 quadrants. Pt states +flatus but denies BM at this time.  LTV incision with dressing clean dry and intact.  FF@ U-3 small amount of rubra noted.  Pt AAO X 3 call bell in reach. Pt denied pain at this time.  SL to LFA intact without redness or swelling.  Plan of care discussed with pt. Pt voiced understanding and consent. Pt denies needs at this time.  Pt instructed to call for any and all needs.

## 2024-07-05 NOTE — PLAN OF CARE
07/05/24 1345   Final Note   Assessment Type Final Discharge Note   Anticipated Discharge Disposition Home   What phone number can be called within the next 1-3 days to see how you are doing after discharge? [phone]   Post-Acute Status   Discharge Delays None known at this time     Discharge orders and chart reviewed with no further post-acute discharge needs identified at this time.  At this time, patient is cleared for discharge from Case Management standpoint.

## 2024-07-06 LAB — GLUCOSE SERPL-MCNC: 89 MG/DL (ref 70–110)

## 2024-07-06 PROCEDURE — 12000002 HC ACUTE/MED SURGE SEMI-PRIVATE ROOM

## 2024-07-06 PROCEDURE — 25000003 PHARM REV CODE 250: Performed by: SPECIALIST

## 2024-07-06 RX ADMIN — IBUPROFEN 600 MG: 400 TABLET ORAL at 12:07

## 2024-07-06 RX ADMIN — METFORMIN HYDROCHLORIDE 500 MG: 500 TABLET, FILM COATED ORAL at 09:07

## 2024-07-06 RX ADMIN — IBUPROFEN 600 MG: 400 TABLET ORAL at 06:07

## 2024-07-06 RX ADMIN — METFORMIN HYDROCHLORIDE 500 MG: 500 TABLET, FILM COATED ORAL at 04:07

## 2024-07-06 RX ADMIN — PRENATAL VIT W/ FE FUMARATE-FA TAB 27-0.8 MG 1 TABLET: 27-0.8 TAB at 09:07

## 2024-07-06 RX ADMIN — DOCUSATE SODIUM 200 MG: 100 CAPSULE, LIQUID FILLED ORAL at 09:07

## 2024-07-06 RX ADMIN — MUPIROCIN 1 G: 20 OINTMENT TOPICAL at 09:07

## 2024-07-06 NOTE — PLAN OF CARE
07/06/24 1022   Final Note   Assessment Type Final Discharge Note   Anticipated Discharge Disposition Home   Post-Acute Status   Discharge Delays None known at this time

## 2024-07-06 NOTE — LACTATION NOTE
This note was copied from a baby's chart.     07/06/24 0898   Maternal Assessment   Right Nipple Symptoms bruised;tender;redness   Maternal Infant Feeding   Maternal Emotional State assist needed   Infant Positioning clutch/football   Signs of Milk Transfer audible swallow   Pain with Feeding no   Latch Assistance yes     Assisted with position & latch. Baby having difficulty with latch, nipple shield used. Baby latched on well. After about 10 minutes, was able to remove the shield & latch baby on without the shield. Good nutritive sucking & swallowing noted. Silicone manual pump place on the left breast while baby nursed on the right. Baby breast fed for 25 minutes. Supplemented baby with 10 ml of ebm. Assistance offered prn. Mom verbalized understanding

## 2024-07-06 NOTE — ASSESSMENT & PLAN NOTE
POD #3  Trouble breast feeding - lactation to see today  Crying =- discussed getting sleep today

## 2024-07-06 NOTE — SUBJECTIVE & OBJECTIVE
"Interval History: POD #3    She is not doing well mentally this morning.  She is overwhelmed from having CS and having to use donor milk for the baby who has lost over 10% weight. She reports no mental illness in the past,  She thinks her crying is from sleep deprivation.      Regular diet, no nausea or vomiting, had passed gas had a BM.    Discussed staying the complete 4 days for her mental health and breast feeding support, she is OK staying.    Objective:     Vital Signs (Most Recent):  Temp: 98.4 °F (36.9 °C) (07/06/24 0343)  Pulse: 63 (07/06/24 0343)  Resp: 16 (07/06/24 0343)  BP: 132/76 (07/06/24 0025)  SpO2: 99 % (07/06/24 0025) Vital Signs (24h Range):  Temp:  [98.2 °F (36.8 °C)-98.8 °F (37.1 °C)] 98.4 °F (36.9 °C)  Pulse:  [63-72] 63  Resp:  [12-20] 16  SpO2:  [97 %-99 %] 99 %  BP: (119-135)/(75-89) 132/76     Weight: 121.6 kg (268 lb)  Body mass index is 43.92 kg/m².    No intake or output data in the 24 hours ending 07/06/24 1021      Significant Labs:  Lab Results   Component Value Date    GROUPTRH O POS 07/03/2024    HEPBSAG Negative 11/27/2023    STREPBCULT Negative 06/13/2024     No results for input(s): "HGB", "HCT" in the last 48 hours.    I have personallly reviewed all pertinent lab results from the last 24 hours.    Physical Exam    Review of Systems  "

## 2024-07-06 NOTE — LACTATION NOTE
This note was copied from a baby's chart.     07/06/24 1200   Maternal Assessment   Breast Size Issue none   Breast Shape round   Breast Density filling   Areola elastic   Nipples Left:;everted;Right:;retracting   Maternal Infant Feeding   Maternal Emotional State assist needed   Infant Positioning clutch/football   Signs of Milk Transfer audible swallow   Pain with Feeding no   Latch Assistance yes     Assisted with position & latch. Baby latched on well. Good nutritive sucking & swallowing noted. Milk noted on the side of baby's mouth. Mom's milk is coming in. Silicone manual breast pump used on the left breast while baby nursed on the right. Baby breast fed for 20 minutes. Supplemented baby with 15 ml of ebm. Mom very happy that she was able to see milk come out of her breast. Assistance offered prn. Mom verbalized understanding

## 2024-07-06 NOTE — PROGRESS NOTES
"[facility]  Obstetrics  Postpartum Progress Note    Patient Name: Maxine Sanchez  MRN: [id number]  Admission Date: 7/3/2024  Hospital Length of Stay: 3 days  Attending Physician: Ed Momin MD  Primary Care Provider: Rylee, Primary Doctor    Subjective:     Principal Problem:Delivery by elective  section    Hospital Course:  29-year-old  1 para 0 at 37 weeks gestational age with type 2 diabetes admitted for primary  section secondary to breech presentation and per [facility] recommendation of delivery at 37 weeks secondary to morbid obesity and type 2 diabetes.  Patient on insulin for glucose control.  Patient underwent primary low transverse  section, please see operative note for details.    Interval History: POD #3    She is not doing well mentally this morning.  She is overwhelmed from having CS and having to use donor milk for the baby who has lost over 10% weight. She reports no mental illness in the past,  She thinks her crying is from sleep deprivation.      Regular diet, no nausea or vomiting, had passed gas had a BM.    Discussed staying the complete 4 days for her mental health and breast feeding support, she is OK staying.    Objective:     Vital Signs (Most Recent):  Temp: 98.4 °F (36.9 °C) (24 0343)  Pulse: 63 (24 0343)  Resp: 16 (24 0343)  BP: 132/76 (24 0025)  SpO2: 99 % (24 0025) Vital Signs (24h Range):  Temp:  [98.2 °F (36.8 °C)-98.8 °F (37.1 °C)] 98.4 °F (36.9 °C)  Pulse:  [63-72] 63  Resp:  [12-20] 16  SpO2:  [97 %-99 %] 99 %  BP: (119-135)/(75-89) 132/76     Weight: 121.6 kg (268 lb)  Body mass index is 43.92 kg/m².    No intake or output data in the 24 hours ending 24 1021      Significant Labs:  Lab Results   Component Value Date    GROUPTRH O POS 2024    HEPBSAG Negative 2023    STREPBCULT Negative 2024     No results for input(s): "HGB", "HCT" in the last 48 hours.    I have personallly reviewed " all pertinent lab results from the last 24 hours.    Physical Exam    Review of Systems  Assessment/Plan:     29 y.o. female  for:    * Delivery by elective  section  POD #3  Trouble breast feeding - lactation to see today  Crying =- discussed getting sleep today      Pregnancy  Pod 1.-status post A-[id number] well   Advances orders   Type 2 diabetes-glucose levels within normal limits, not requiring insulin after delivery        Disposition: As patient meets milestones, will plan to discharge tomorrow.    Chelly Reed MD  Obstetrics  [facility]

## 2024-07-07 VITALS
HEART RATE: 82 BPM | HEIGHT: 66 IN | BODY MASS INDEX: 43.07 KG/M2 | TEMPERATURE: 98 F | WEIGHT: 268 LBS | RESPIRATION RATE: 18 BRPM | DIASTOLIC BLOOD PRESSURE: 87 MMHG | OXYGEN SATURATION: 100 % | SYSTOLIC BLOOD PRESSURE: 123 MMHG

## 2024-07-07 PROCEDURE — 25000003 PHARM REV CODE 250: Performed by: SPECIALIST

## 2024-07-07 RX ORDER — METFORMIN HYDROCHLORIDE 500 MG/1
500 TABLET ORAL 2 TIMES DAILY WITH MEALS
Qty: 60 TABLET | Refills: 3 | Status: SHIPPED | OUTPATIENT
Start: 2024-07-07 | End: 2024-08-06

## 2024-07-07 RX ADMIN — IBUPROFEN 600 MG: 400 TABLET ORAL at 01:07

## 2024-07-07 RX ADMIN — PRENATAL VIT W/ FE FUMARATE-FA TAB 27-0.8 MG 1 TABLET: 27-0.8 TAB at 07:07

## 2024-07-07 RX ADMIN — IBUPROFEN 600 MG: 400 TABLET ORAL at 12:07

## 2024-07-07 RX ADMIN — METFORMIN HYDROCHLORIDE 500 MG: 500 TABLET, FILM COATED ORAL at 07:07

## 2024-07-07 RX ADMIN — DOCUSATE SODIUM 200 MG: 100 CAPSULE, LIQUID FILLED ORAL at 07:07

## 2024-07-07 RX ADMIN — IBUPROFEN 600 MG: 400 TABLET ORAL at 05:07

## 2024-07-07 NOTE — DISCHARGE INSTRUCTIONS
Pelvic rest for 6 weeks (no sex, tampons, douching, nothing in the vagina)   You can experience vaginal bleeding on and off for up to 6 weeks, it will gradually get lighter and the color will change from bright red to a brownish discharge towards the end.     Activity:   NO strenuous activities or exercising for 6 weeks. Do not /lift anything over 15 pounds and no heavy housework or cleaning for 6 weeks. Limit stair climbing to twice a day during the first 2 weeks.   NO driving for 4 weeks. You may take short car trips but do not drive.   You may shower ONLY for the first 2 weeks, after 2 weeks you can soak in a bathtub. Use a mild soap, no heavy perfumes or fragrances to avoid irritation.   Walking frequently following a  delivery promotes healing and decreases pain associated with gas.   If constipation develops: You may take Colace (stool softener), Milk of Magnesia, Dulcolax or Miralax. All of these medications are sold over the counter.   Incision Care:   Clean your incision with mild soap & warm water only- do not scrub- let warm water run over it, then pat dry.     Pain Relief:   You may take Motrin for mild pain & uterine cramping.     Emotional Changes:   You may experience baby blues after delivery. You may feel let down, anxious and cry easily. This is normal. These feelings can begin 2-3 days after delivery and usually disappear in about a week or two. Prolonged sadness may indicate postpartum depression.     Call your doctor for any of the following:   Difficulty breathing, problems with any of your medications, inability to eat.   Foul smelling vaginal discharge.   If you notice pus-like drainage from your incision, if your incision or the area around it becomes hot or swollen, or if you notice a foul smelling odor.   Temperature above 100.4.   Heavy vaginal bleeding. All women bleed different after delivery and each delivery is different. Heavy bleeding consists of saturating a aquiles  pad in a 1 hour time period. Passing clots are normal, if you pass a blood clot larger than the size of a golf ball call your doctor's office.   If you experience pain in your legs/calves, if one leg increases in size and becomes swollen or becomes hot to touch or discolored.   Crying or periods of sadness beyond 2 weeks.     If you are breast feeding:   Wash your breasts with mild soap and warm water.   You should wear a supportive bra.   You should continue to take a prenatal vitamin for 6 weeks or until breastfeeding is discontinued.   If nipples are sore, apply a few drops of breast milk after nursing and let air dry or you can use Lanolin cream.   If breasts are engorged, apply warmth and express milk.   Call Lactation Consultant for any questions with breastfeeding.  ([phone])    If you are not breastfeeding:   Wear a tight bra and do not stimulate your breasts. Avoid handling your breasts and do not express milk. You may apply ice packs or cabbage leaves to relieve discomfort from engorgement. If your breasts become warm to touch, reddened or lumps develop call your doctor.

## 2024-07-07 NOTE — DISCHARGE SUMMARY
[facility]  Obstetrics  Discharge Summary      Patient Name: Maxine Sanchez  MRN: [id number]  Admission Date: 7/3/2024  Hospital Length of Stay: 4 days  Discharge Date and Time: No discharge date for patient encounter.  Attending Physician: Ed Momin MD   Discharging Provider: Britney Egan MD, MD   Primary Care Provider: No, Primary Doctor      Procedure(s) (LRB):   SECTION (N/A)     Hospital Course:   29-year-old  1 para 0 at 37 weeks gestational age with type 2 diabetes admitted for primary  section secondary to breech presentation and per [facility] recommendation of delivery at 37 weeks secondary to morbid obesity and type 2 diabetes.  Patient on insulin for glucose control.  Patient underwent primary low transverse  section, please see operative note for details.    Mother and infant recovered well.  On postop day 4 she was meeting all goals for discharge. Pain well controlled, tolerating regular diet, ambulating and voiding without difficulty, passing flatus, no heavy bleeding. VSS and exam reassuring.  Patient is a type 2 diabetic, initiated on insulin therapy during her pregnancy.  She is back on her prepregnancy metformin, but at this time is requiring a lower dose at 500 mg p.o. b.i.d..  Accu-Cheks appropriate.  Precautions reviewed with patient and she will follow up with Dr. Momin.    Vitals:    24 1935 24 2308 24 0404 24 0729   BP: 132/86 119/77 121/80 123/87   BP Location: Right arm Left arm Right arm    Patient Position: Sitting Lying Sitting    Pulse: 70 (!) 59 68 82   Resp: 18 18 18 18   Temp: 98.3 °F (36.8 °C) 97.7 °F (36.5 °C) 98.1 °F (36.7 °C) 98.3 °F (36.8 °C)   TempSrc: Oral Oral Oral    SpO2: 97% 99% 98% 100%   Weight:       Height:            Exam:  NAD, AAO  Regular rate  Nonlabored respirations  Ab: fundus firm below the umbilicus, appropriate abdominal tenderness, bandage clean/dry/intact  : appropriate  lochia  Extremities:  Nontender calves, no evidence of DVT           Final Active Diagnoses:    Diagnosis Date Noted POA    PRINCIPAL PROBLEM:  Delivery by elective  section [O82] 2024 Yes    Pregnancy [Z34.90] 2024 Not Applicable    Diabetes mellitus affecting pregnancy, third trimester [O24.913] 2024 Yes      Problems Resolved During this Admission:        Significant Diagnostic Studies: Labs:     Lab Results   Component Value Date    GROUPTRH O POS 2024      Latest Reference Range & Units 24 03:50   WBC 3.90 - 12.70 K/uL 12.20   RBC 4.00 - 5.40 M/uL 4.11   Hemoglobin 12.0 - 16.0 g/dL 11.1 (L)   Hematocrit 37.0 - 48.5 % 34.3 (L)   MCV 82 - 98 fL 84   MCH 27.0 - 31.0 pg 27.0   MCHC 32.0 - 36.0 g/dL 32.4   RDW 11.5 - 14.5 % 14.3   Platelet Count 150 - 450 K/uL 262   MPV 9.2 - 12.9 fL 10.3   Gran % 38.0 - 73.0 % 78.4 (H)   Lymph % 18.0 - 48.0 % 15.9 (L)   Mono % 4.0 - 15.0 % 4.2   Eos % 0.0 - 8.0 % 0.7   Basophil % 0.0 - 1.9 % 0.3   Immature Granulocytes 0.0 - 0.5 % 0.5   Gran # (ANC) 1.8 - 7.7 K/uL 9.6 (H)   Lymph # 1.0 - 4.8 K/uL 1.9   Mono # 0.3 - 1.0 K/uL 0.5   Eos # 0.0 - 0.5 K/uL 0.1   Baso # 0.00 - 0.20 K/uL 0.04   Immature Grans (Abs) 0.00 - 0.04 K/uL 0.06 (H)   nRBC 0 /100 WBC 0   Differential Method  Automated   (L): Data is abnormally low  (H): Data is abnormally high     Latest Reference Range & Units 24 05:25   Sodium 136 - 145 mmol/L 137   Potassium 3.5 - 5.1 mmol/L 3.8   Chloride 95 - 110 mmol/L 110   CO2 23 - 29 mmol/L 20 (L)   Anion Gap 8 - 16 mmol/L 7 (L)   BUN 6 - 20 mg/dL 14   Creatinine 0.5 - 1.4 mg/dL 0.5   eGFR >60 mL/min/1.73 m^2 >60.0   Glucose 70 - 110 mg/dL 84   Calcium 8.7 - 10.5 mg/dL 7.8 (L)   ALP 55 - 135 U/L 116   PROTEIN TOTAL 6.0 - 8.4 g/dL 5.6 (L)   Albumin 3.5 - 5.2 g/dL 3.1 (L)   BILIRUBIN TOTAL 0.1 - 1.0 mg/dL 0.3   AST 10 - 40 U/L 16   ALT 10 - 44 U/L 18   (L): Data is abnormally low  Feeding Method: both breast and  "bottle    Immunizations       Date Immunization Status Dose Route/Site Given by    24 MMR Incomplete 0.5 mL Subcutaneous/     24 Tdap Incomplete 0.5 mL Intramuscular/             Delivery:    Episiotomy:     Lacerations:     Repair suture:     Repair # of packets:     Blood loss (ml):       Birth information:  YOB: 2024   Time of birth: 7:44 AM   Sex: female   Delivery type: , Low Transverse   Gestational Age: 37w0d     Measurements    Weight: 2575 g  Weight (lbs): 5 lb 10.8 oz  Length: 47 cm  Length (in): 18.5"         Delivery Clinician: Delivery Providers    Delivering clinician: Ed Momin MD   Provider Role    Mellissa Santiago, Anthony Ocampo, RN     Charlotte Costa Jessica, Zita Herrera RN Hedgepeth, Madyson, RN              Additional  information:  Forceps:    Vacuum:    Breech:    Observed anomalies      Living?:     Apgars    Living status: Living  Apgar Component Scores:  1 min.:  5 min.:  10 min.:  15 min.:  20 min.:    Skin color:  0  1       Heart rate:  2  2       Reflex irritability:  2  2       Muscle tone:  2  2       Respiratory effort:  2  2       Total:  8  9       Apgars assigned by: TOBY HERNANDEZ         Placenta: Delivered:       appearance  Pending Diagnostic Studies:       None            Discharged Condition: good    Disposition: Home or Self Care    Follow Up:   Follow-up Information       Ed Momin MD Follow up in 2 week(s).    Specialty: Obstetrics and Gynecology  Why: For wound re-check, Postpartum check  Contact information:  [address]  [place], [place], [place] [facility] [address]  [phone]                           Patient Instructions:      BREAST PUMP FOR HOME USE     Order Specific Question Answer Comments   Type of pump: Electric    Weight: 121.6 kg (268 lb)    Length of need (1-99 months): 99      Diet diabetic     Diet diabetic     Lifting restrictions   Order " Comments: No more than 10lbs     Pelvic Rest     Notify your health care provider if you experience any of the following:  temperature >100.4     Notify your health care provider if you experience any of the following:  persistent nausea and vomiting or diarrhea     Notify your health care provider if you experience any of the following:  severe uncontrolled pain     Notify your health care provider if you experience any of the following:  redness, tenderness, or signs of infection (pain, swelling, redness, odor or green/yellow discharge around incision site)     Notify your health care provider if you experience any of the following:  difficulty breathing or increased cough     Notify your health care provider if you experience any of the following:  severe persistent headache     Notify your health care provider if you experience any of the following:  worsening rash     Notify your health care provider if you experience any of the following:  persistent dizziness, light-headedness, or visual disturbances     Notify your health care provider if you experience any of the following:  increased confusion or weakness     Leave dressing on - Keep it clean, dry, and intact until clinic visit     Lifting restrictions   Order Comments: No more than 10lbs     Pelvic Rest     Notify your health care provider if you experience any of the following:  temperature >100.4     Notify your health care provider if you experience any of the following:  persistent nausea and vomiting or diarrhea     Notify your health care provider if you experience any of the following:  severe uncontrolled pain     Notify your health care provider if you experience any of the following:  redness, tenderness, or signs of infection (pain, swelling, redness, odor or green/yellow discharge around incision site)     Notify your health care provider if you experience any of the following:  difficulty breathing or increased cough     Notify your health  "care provider if you experience any of the following:  severe persistent headache     Notify your health care provider if you experience any of the following:  worsening rash     Notify your health care provider if you experience any of the following:  persistent dizziness, light-headedness, or visual disturbances     Notify your health care provider if you experience any of the following:  increased confusion or weakness     Leave dressing on - Keep it clean, dry, and intact until clinic visit     Medications:  Current Discharge Medication List        START taking these medications    Details   ibuprofen (ADVIL,MOTRIN) 600 MG tablet Take 1 tablet (600 mg total) by mouth every 6 (six) hours as needed for Pain.  Qty: 60 tablet, Refills: 0      oxyCODONE-acetaminophen (PERCOCET) 5-325 mg per tablet Take 1 tablet by mouth every 4 (four) hours as needed for Pain.  Qty: 28 tablet, Refills: 0    Comments: Quantity prescribed more than 7 day supply? No           CONTINUE these medications which have CHANGED    Details   metFORMIN (GLUCOPHAGE) 500 MG tablet Take 1 tablet (500 mg total) by mouth 2 (two) times daily with meals.  Qty: 60 tablet, Refills: 3           CONTINUE these medications which have NOT CHANGED    Details   loratadine (CLARITIN) 10 mg tablet Take 10 mg by mouth once daily.      prenatal vit no.124/iron/folic (PRENATAL VITAMIN ORAL) Take by mouth.           STOP taking these medications       aspirin (ECOTRIN) 81 MG EC tablet Comments:   Reason for Stopping:         insulin aspart U-100 (NOVOLOG U-100 INSULIN ASPART) 100 unit/mL injection Comments:   Reason for Stopping:         insulin NPH (HUMULIN N NPH U-100 INSULIN) 100 unit/mL injection Comments:   Reason for Stopping:         insulin syringe-needle U-100 0.5 mL 31 gauge x 5/16" Syrg Comments:   Reason for Stopping:         valACYclovir (VALTREX) 1000 MG tablet Comments:   Reason for Stopping:               Britney Egan MD, " MD  Obstetrics  [facility]

## 2024-07-08 ENCOUNTER — PATIENT MESSAGE (OUTPATIENT)
Dept: OBSTETRICS AND GYNECOLOGY | Facility: HOSPITAL | Age: 29
End: 2024-07-08

## 2024-07-24 ENCOUNTER — PATIENT MESSAGE (OUTPATIENT)
Dept: MATERNAL FETAL MEDICINE | Facility: CLINIC | Age: 29
End: 2024-07-24
Payer: COMMERCIAL

## (undated) DEVICE — BINDER ABDOMINAL 9 46-62

## (undated) DEVICE — DRESSING MEPILEX 4X12IN

## (undated) DEVICE — Device